# Patient Record
Sex: FEMALE | Race: ASIAN | NOT HISPANIC OR LATINO
[De-identification: names, ages, dates, MRNs, and addresses within clinical notes are randomized per-mention and may not be internally consistent; named-entity substitution may affect disease eponyms.]

---

## 2019-08-24 ENCOUNTER — TRANSCRIPTION ENCOUNTER (OUTPATIENT)
Age: 29
End: 2019-08-24

## 2019-08-25 ENCOUNTER — INPATIENT (INPATIENT)
Facility: HOSPITAL | Age: 29
LOS: 0 days | Discharge: HOME | End: 2019-08-26
Attending: OBSTETRICS & GYNECOLOGY | Admitting: OBSTETRICS & GYNECOLOGY
Payer: MEDICAID

## 2019-08-25 ENCOUNTER — RESULT REVIEW (OUTPATIENT)
Age: 29
End: 2019-08-25

## 2019-08-25 VITALS
SYSTOLIC BLOOD PRESSURE: 101 MMHG | RESPIRATION RATE: 18 BRPM | TEMPERATURE: 99 F | HEART RATE: 87 BPM | DIASTOLIC BLOOD PRESSURE: 57 MMHG | OXYGEN SATURATION: 100 %

## 2019-08-25 DIAGNOSIS — O00.90 UNSPECIFIED ECTOPIC PREGNANCY WITHOUT INTRAUTERINE PREGNANCY: ICD-10-CM

## 2019-08-25 LAB
ALBUMIN SERPL ELPH-MCNC: 4.3 G/DL — SIGNIFICANT CHANGE UP (ref 3.3–5)
ALBUMIN SERPL ELPH-MCNC: 4.4 G/DL — SIGNIFICANT CHANGE UP (ref 3.3–5)
ALP SERPL-CCNC: 69 U/L — SIGNIFICANT CHANGE UP (ref 40–120)
ALP SERPL-CCNC: 71 U/L — SIGNIFICANT CHANGE UP (ref 40–120)
ALT FLD-CCNC: 16 U/L — SIGNIFICANT CHANGE UP (ref 4–33)
ALT FLD-CCNC: 16 U/L — SIGNIFICANT CHANGE UP (ref 4–33)
ANION GAP SERPL CALC-SCNC: 12 MMO/L — SIGNIFICANT CHANGE UP (ref 7–14)
APPEARANCE UR: CLEAR — SIGNIFICANT CHANGE UP
APTT BLD: 30 SEC — SIGNIFICANT CHANGE UP (ref 27.5–36.3)
APTT BLD: 30.2 SEC — SIGNIFICANT CHANGE UP (ref 27.5–36.3)
AST SERPL-CCNC: 17 U/L — SIGNIFICANT CHANGE UP (ref 4–32)
AST SERPL-CCNC: 18 U/L — SIGNIFICANT CHANGE UP (ref 4–32)
BACTERIA # UR AUTO: SIGNIFICANT CHANGE UP
BASOPHILS # BLD AUTO: 0.04 K/UL — SIGNIFICANT CHANGE UP (ref 0–0.2)
BASOPHILS NFR BLD AUTO: 0.5 % — SIGNIFICANT CHANGE UP (ref 0–2)
BILIRUB DIRECT SERPL-MCNC: < 0.2 MG/DL — SIGNIFICANT CHANGE UP (ref 0.1–0.2)
BILIRUB SERPL-MCNC: 0.3 MG/DL — SIGNIFICANT CHANGE UP (ref 0.2–1.2)
BILIRUB SERPL-MCNC: 0.3 MG/DL — SIGNIFICANT CHANGE UP (ref 0.2–1.2)
BILIRUB UR-MCNC: NEGATIVE — SIGNIFICANT CHANGE UP
BLD GP AB SCN SERPL QL: POSITIVE — SIGNIFICANT CHANGE UP
BLOOD UR QL VISUAL: HIGH
BUN SERPL-MCNC: 11 MG/DL — SIGNIFICANT CHANGE UP (ref 7–23)
CALCIUM SERPL-MCNC: 8.8 MG/DL — SIGNIFICANT CHANGE UP (ref 8.4–10.5)
CHLORIDE SERPL-SCNC: 103 MMOL/L — SIGNIFICANT CHANGE UP (ref 98–107)
CO2 SERPL-SCNC: 23 MMOL/L — SIGNIFICANT CHANGE UP (ref 22–31)
COLOR SPEC: SIGNIFICANT CHANGE UP
CREAT SERPL-MCNC: 0.58 MG/DL — SIGNIFICANT CHANGE UP (ref 0.5–1.3)
EOSINOPHIL # BLD AUTO: 0.08 K/UL — SIGNIFICANT CHANGE UP (ref 0–0.5)
EOSINOPHIL NFR BLD AUTO: 1 % — SIGNIFICANT CHANGE UP (ref 0–6)
FIBRINOGEN PPP-MCNC: 466.5 MG/DL — SIGNIFICANT CHANGE UP (ref 350–510)
GLUCOSE SERPL-MCNC: 90 MG/DL — SIGNIFICANT CHANGE UP (ref 70–99)
GLUCOSE UR-MCNC: NEGATIVE — SIGNIFICANT CHANGE UP
HCG SERPL-ACNC: 336.1 MIU/ML — SIGNIFICANT CHANGE UP
HCT VFR BLD CALC: 29.8 % — LOW (ref 34.5–45)
HCT VFR BLD CALC: 30.5 % — LOW (ref 34.5–45)
HCT VFR BLD CALC: 30.6 % — LOW (ref 34.5–45)
HGB BLD-MCNC: 9.5 G/DL — LOW (ref 11.5–15.5)
HGB BLD-MCNC: 9.8 G/DL — LOW (ref 11.5–15.5)
HGB BLD-MCNC: 9.8 G/DL — LOW (ref 11.5–15.5)
HYALINE CASTS # UR AUTO: NEGATIVE — SIGNIFICANT CHANGE UP
IMM GRANULOCYTES NFR BLD AUTO: 0.4 % — SIGNIFICANT CHANGE UP (ref 0–1.5)
INR BLD: 1.1 — SIGNIFICANT CHANGE UP (ref 0.88–1.17)
INR BLD: 1.12 — SIGNIFICANT CHANGE UP (ref 0.88–1.17)
KETONES UR-MCNC: NEGATIVE — SIGNIFICANT CHANGE UP
LEUKOCYTE ESTERASE UR-ACNC: NEGATIVE — SIGNIFICANT CHANGE UP
LYMPHOCYTES # BLD AUTO: 1.87 K/UL — SIGNIFICANT CHANGE UP (ref 1–3.3)
LYMPHOCYTES # BLD AUTO: 22.2 % — SIGNIFICANT CHANGE UP (ref 13–44)
MCHC RBC-ENTMCNC: 25.2 PG — LOW (ref 27–34)
MCHC RBC-ENTMCNC: 25.3 PG — LOW (ref 27–34)
MCHC RBC-ENTMCNC: 25.4 PG — LOW (ref 27–34)
MCHC RBC-ENTMCNC: 31.9 % — LOW (ref 32–36)
MCHC RBC-ENTMCNC: 32 % — SIGNIFICANT CHANGE UP (ref 32–36)
MCHC RBC-ENTMCNC: 32.1 % — SIGNIFICANT CHANGE UP (ref 32–36)
MCV RBC AUTO: 78.6 FL — LOW (ref 80–100)
MCV RBC AUTO: 79 FL — LOW (ref 80–100)
MCV RBC AUTO: 79.3 FL — LOW (ref 80–100)
MONOCYTES # BLD AUTO: 0.54 K/UL — SIGNIFICANT CHANGE UP (ref 0–0.9)
MONOCYTES NFR BLD AUTO: 6.4 % — SIGNIFICANT CHANGE UP (ref 2–14)
NEUTROPHILS # BLD AUTO: 5.85 K/UL — SIGNIFICANT CHANGE UP (ref 1.8–7.4)
NEUTROPHILS NFR BLD AUTO: 69.5 % — SIGNIFICANT CHANGE UP (ref 43–77)
NITRITE UR-MCNC: NEGATIVE — SIGNIFICANT CHANGE UP
NRBC # FLD: 0 K/UL — SIGNIFICANT CHANGE UP (ref 0–0)
PH UR: 6.5 — SIGNIFICANT CHANGE UP (ref 5–8)
PLATELET # BLD AUTO: 234 K/UL — SIGNIFICANT CHANGE UP (ref 150–400)
PLATELET # BLD AUTO: 238 K/UL — SIGNIFICANT CHANGE UP (ref 150–400)
PLATELET # BLD AUTO: 245 K/UL — SIGNIFICANT CHANGE UP (ref 150–400)
PMV BLD: 10 FL — SIGNIFICANT CHANGE UP (ref 7–13)
PMV BLD: 9.7 FL — SIGNIFICANT CHANGE UP (ref 7–13)
PMV BLD: 9.8 FL — SIGNIFICANT CHANGE UP (ref 7–13)
POTASSIUM SERPL-MCNC: 3.9 MMOL/L — SIGNIFICANT CHANGE UP (ref 3.5–5.3)
POTASSIUM SERPL-SCNC: 3.9 MMOL/L — SIGNIFICANT CHANGE UP (ref 3.5–5.3)
PROT SERPL-MCNC: 7.3 G/DL — SIGNIFICANT CHANGE UP (ref 6–8.3)
PROT SERPL-MCNC: 7.3 G/DL — SIGNIFICANT CHANGE UP (ref 6–8.3)
PROT UR-MCNC: NEGATIVE — SIGNIFICANT CHANGE UP
PROTHROM AB SERPL-ACNC: 12.2 SEC — SIGNIFICANT CHANGE UP (ref 9.8–13.1)
PROTHROM AB SERPL-ACNC: 12.5 SEC — SIGNIFICANT CHANGE UP (ref 9.8–13.1)
RBC # BLD: 3.77 M/UL — LOW (ref 3.8–5.2)
RBC # BLD: 3.86 M/UL — SIGNIFICANT CHANGE UP (ref 3.8–5.2)
RBC # BLD: 3.88 M/UL — SIGNIFICANT CHANGE UP (ref 3.8–5.2)
RBC # FLD: 13.5 % — SIGNIFICANT CHANGE UP (ref 10.3–14.5)
RBC # FLD: 13.5 % — SIGNIFICANT CHANGE UP (ref 10.3–14.5)
RBC # FLD: 13.7 % — SIGNIFICANT CHANGE UP (ref 10.3–14.5)
RBC CASTS # UR COMP ASSIST: SIGNIFICANT CHANGE UP (ref 0–?)
RH IG SCN BLD-IMP: POSITIVE — SIGNIFICANT CHANGE UP
SODIUM SERPL-SCNC: 138 MMOL/L — SIGNIFICANT CHANGE UP (ref 135–145)
SP GR SPEC: 1.02 — SIGNIFICANT CHANGE UP (ref 1–1.04)
SQUAMOUS # UR AUTO: SIGNIFICANT CHANGE UP
UROBILINOGEN FLD QL: NORMAL — SIGNIFICANT CHANGE UP
WBC # BLD: 7.32 K/UL — SIGNIFICANT CHANGE UP (ref 3.8–10.5)
WBC # BLD: 7.9 K/UL — SIGNIFICANT CHANGE UP (ref 3.8–10.5)
WBC # BLD: 8.41 K/UL — SIGNIFICANT CHANGE UP (ref 3.8–10.5)
WBC # FLD AUTO: 7.32 K/UL — SIGNIFICANT CHANGE UP (ref 3.8–10.5)
WBC # FLD AUTO: 7.9 K/UL — SIGNIFICANT CHANGE UP (ref 3.8–10.5)
WBC # FLD AUTO: 8.41 K/UL — SIGNIFICANT CHANGE UP (ref 3.8–10.5)
WBC UR QL: SIGNIFICANT CHANGE UP (ref 0–?)

## 2019-08-25 PROCEDURE — 86077 PHYS BLOOD BANK SERV XMATCH: CPT

## 2019-08-25 PROCEDURE — 76817 TRANSVAGINAL US OBSTETRIC: CPT | Mod: 26

## 2019-08-25 PROCEDURE — 88305 TISSUE EXAM BY PATHOLOGIST: CPT | Mod: 26

## 2019-08-25 PROCEDURE — 59151 TREAT ECTOPIC PREGNANCY: CPT

## 2019-08-25 RX ORDER — ACETAMINOPHEN 500 MG
650 TABLET ORAL ONCE
Refills: 0 | Status: COMPLETED | OUTPATIENT
Start: 2019-08-25 | End: 2019-08-25

## 2019-08-25 RX ORDER — SODIUM CHLORIDE 9 MG/ML
1000 INJECTION, SOLUTION INTRAVENOUS
Refills: 0 | Status: DISCONTINUED | OUTPATIENT
Start: 2019-08-25 | End: 2019-08-26

## 2019-08-25 RX ORDER — ONDANSETRON 8 MG/1
4 TABLET, FILM COATED ORAL ONCE
Refills: 0 | Status: DISCONTINUED | OUTPATIENT
Start: 2019-08-25 | End: 2019-08-26

## 2019-08-25 RX ORDER — SODIUM CHLORIDE 9 MG/ML
1000 INJECTION INTRAMUSCULAR; INTRAVENOUS; SUBCUTANEOUS ONCE
Refills: 0 | Status: COMPLETED | OUTPATIENT
Start: 2019-08-25 | End: 2019-08-25

## 2019-08-25 RX ORDER — HYDROMORPHONE HYDROCHLORIDE 2 MG/ML
0.5 INJECTION INTRAMUSCULAR; INTRAVENOUS; SUBCUTANEOUS
Refills: 0 | Status: DISCONTINUED | OUTPATIENT
Start: 2019-08-25 | End: 2019-08-26

## 2019-08-25 RX ADMIN — SODIUM CHLORIDE 1000 MILLILITER(S): 9 INJECTION INTRAMUSCULAR; INTRAVENOUS; SUBCUTANEOUS at 19:40

## 2019-08-25 RX ADMIN — SODIUM CHLORIDE 125 MILLILITER(S): 9 INJECTION, SOLUTION INTRAVENOUS at 18:21

## 2019-08-25 NOTE — ASU DISCHARGE PLAN (ADULT/PEDIATRIC) - CALL YOUR DOCTOR IF YOU HAVE ANY OF THE FOLLOWING:
Nausea and vomiting that does not stop/Fever greater than (need to indicate Fahrenheit or Celsius)/Inability to tolerate liquids or foods/Bleeding that does not stop/Pain not relieved by Medications

## 2019-08-25 NOTE — ASU DISCHARGE PLAN (ADULT/PEDIATRIC) - ACTIVITY LEVEL
No intercourse/No heavy lifting/Nothing per rectum/Nothing per vagina/No tub baths/No tampons/No douching

## 2019-08-25 NOTE — ED PROVIDER NOTE - ABDOMINAL EXAM
tenderness to deep palpation localized to the periumbilical region.  no guarding or rebound tenderness./no organomegaly/soft/no pulsating masses

## 2019-08-25 NOTE — ED ADULT NURSE REASSESSMENT NOTE - NS ED NURSE REASSESS COMMENT FT1
report received at shift change, pt remains AAOx3, VS as noted, pt in NAD, awaiting OR, will continue to monitor pt.

## 2019-08-25 NOTE — ED PROVIDER NOTE - PROGRESS NOTE DETAILS
PA Smartt: US revealed Small cystic structure in endometrium without associated yolk sac or   fetal pole. adnexal mass noted  OB Consult placed. patient will be dispo appropriately pending their recommendations PA Smartt: Patient evaluated by OB and deemed to have rupture ectopic. patient admitted

## 2019-08-25 NOTE — ED PROVIDER NOTE - OBJECTIVE STATEMENT
Patient is a 28 y/o F, , currently pregnant, LMP  presents with c/o lower abdominal that is cramping in nature, nonradiating, accompanied by pinkish/blood tinge urine since . She also noted passage of loose watery stools which has currently resolved. Patient was evaluated in [facility] 2 days ago for same complaint. Work up was positive for Bhcg of 356.4, UA positive for moderate blood. transvaginal ultrasound was inconclusive for IUP or ectopic pregnancy. Patient was instructed to f/u in 2 days for repeat bchg, but she was not satisfied with the quality of care and decided to present to [facility] ER. She denies vaginal bleeding, passage of large clots, dysuria, nausea, vomiting constipation, fever, chills, back pain, HA, dizziness, lightheadness.

## 2019-08-25 NOTE — ED ADULT NURSE REASSESSMENT NOTE - NS ED NURSE REASSESS COMMENT FT1
pt to rm from results waiting area at 6;15/ pt speaks some english./ pt told to call  for clothing that pt still had on/ additional lab done as requested by attending  gyn/ pt vitaled  at this time/ mady anderson to    as per attending consent was given via  / pt  admits to eating small piecs of sons muffin abot 1 hr ago/   pt npo

## 2019-08-25 NOTE — H&P ADULT - ATTENDING COMMENTS
Attending Note     PT arrived to the ER at 11:43 AM     Had ultrasound that resulted at 4:25 pm     Consult called around 5:48     I came down to the see patient immediately    28 yo  LMP  here for abdominal pain  Pt was seen at [facility] at  with hcg 356.4. She presents today with worsening abdominal pain. Ultrasound shows moderate amount of complex fluid   Via Persian interpretator, reviewed that patient most likely has ruptured ectopic pregnancy.  Pt ate 1 hour ago. Reviewed need for emergent surgery given concern for ongoing bleeding and rupture ectopic.   Reviewed risks of the procedure including but not limited to bleeding, infection, damage to surrounding organs, and blood transfusion.   Consent signed   OR notified and setting up room   Type and cross 3 U PRBCS.     Sign out given to Dr. Malick Appiah MD MSc Attending Note     PT arrived to the ER at 11:43 AM     Had ultrasound that resulted at 4:25 pm     Consult called around 5:48     I came down to the see patient immediately    30 yo  LMP  here for abdominal pain  Pt was seen at [facility] at  with hcg 356.4. She presents today with worsening abdominal pain. Ultrasound shows moderate amount of complex fluid   Via Romansh interpretator, reviewed that patient most likely has ruptured ectopic pregnancy.  Pt ate 1 hour ago. Reviewed need for emergent surgery given concern for ongoing bleeding and rupture ectopic.   Reviewed risks of the procedure including but not limited to bleeding, infection, damage to surrounding organs, and blood transfusion.   Consent signed   OR notified and setting up room   Type and cross 3 U PRBCS.     Sign out given to Dr. Malick Appiah MD MSc

## 2019-08-25 NOTE — ED PROVIDER NOTE - CLINICAL SUMMARY MEDICAL DECISION MAKING FREE TEXT BOX
31 y/o F,  currently pregnant, presents with lower abdominal pain and blood tinge urine since . Patient evaluated at [facility] for same complaints 2 days ago, Bhc, UA + for blood, trans vaginal did not confirm IUP or ectopic pregnancy. patient states symptoms continue to persist. Plan is to trend Bhcg and repeat TVUS to r/o ectopic pregnancy. Patient will be consulted by GYN 31 y/o F,  currently pregnant, presents with lower abdominal pain and blood tinge urine since . Patient evaluated at [facility] for same complaints 2 days ago, Bhc.4, UA + for blood, trans vaginal did not confirm IUP or ectopic pregnancy. patient states symptoms continue to persist. Plan is to trend Bhcg and repeat TVUS to r/o ectopic pregnancy. Patient will be consulted by GYN

## 2019-08-25 NOTE — BRIEF OPERATIVE NOTE - OPERATION/FINDINGS
200 cc of hemoperitoneum evacuated. Ruptured ectopic pregnancy in R tube. Grossly normal survey of liver, bowel.

## 2019-08-25 NOTE — H&P ADULT - PROBLEM SELECTOR PLAN 1
- patient moved from results waiting to ED 16 for continuous monitoring  - fluid bolus stat  - stat CBC, coags, T&S  - admit to [facility], GYN  - 3 units pRBCs on hold, discussed with blood bank  - patient added on for OR  - consent in chart, signed  - patient counseled extensively    VICENTE Castellon PGY2  Patient seen and counseled with Dr. Appiah - patient moved from results waiting to ED 16 for continuous monitoring  - fluid bolus stat  - stat CBC, coags, T&S  - admit to [facility], GYN ( as Dr. Byers does not have laparoscopic privileges)  - 3 units pRBCs on hold, discussed with blood bank  - patient added on for OR  - consent in chart, signed  - patient counseled extensively    VICENTE Castellon PGY2  Patient seen and counseled with Dr. Appiah - patient moved from results waiting to ED 16 for continuous monitoring  - fluid bolus stat  - stat CBC, coags, T&S  - Rh positive, no Rhogam  - 3 units pRBCs on hold, pt antibody positive, discussed with blood bank  - admit to [facility], GYN ( as Dr. Byers does not have laparoscopic privileges)  - patient added on for OR  - consent in chart, signed  - patient counseled extensively    VICENTE Castellon PGY2  Patient seen and counseled with Dr. De Los Santos-Tej

## 2019-08-25 NOTE — CHART NOTE - NSCHARTNOTEFT_GEN_A_CORE
Backcharted due to clinic duties    Pt case had been booked in the OR, but was then bumped due to emergency case. Pt labs and vital signs stable at this time.     CINDA Arredondo PGY2  Dr. Teresa [place] [facility] [place] [place] attending and Dr. Zimmer  aware

## 2019-08-25 NOTE — ED ADULT TRIAGE NOTE - CHIEF COMPLAINT QUOTE
Pt c/o lower abdominal pain since 8/17 accompanied by intermittent diarrhea. Pt states last normal MP 7/13 and she is presently bleeding "a little bit"

## 2019-08-25 NOTE — H&P ADULT - HISTORY OF PRESENT ILLNESS
30 y/o , LMP 19, presenting with abdominal pain. Patient of Dr. Byers's. Has known pregnancy of unknown location, was seen at [facility] 19 at which time bHCG was 356.4 and TVUS was inconclusive. Patient instructed to return in 48 hours for close follow up. Presented to [facility] ED today instead. Patient reports abdominal pain since 19 that has been steadily worsening. Pain is 8/10 when she moves and 5/10 when she lays down. Reports vaginal spotting but denies heavy vaginal bleeding, abnormal vaginal discharge. Denies HA, dizziness, chest pain, shortness of breath, palpitations, nausea, vomiting, urinary symptoms, changes in bowel movements. This is a highly desired pregnancy. Patient last ate large meal at 11am, had 2 bites of food 2 hours ago.    OBHx: 2917 C/S for arrest, #6.1, eTOp D&C 2018  GYNHx: denies fibroids, cysts, abnormal paps, STDs, sexually active with   PMH: denies  PSH: C/S  All: NKDA  Meds: denies  Soc: no substance use, anxiety/depression    accepts blood 30 y/o , LMP 19, presenting with abdominal pain. Patient of Dr. Byers's. Has known pregnancy of unknown location, was seen at [facility] 19 at which time bHCG was 356.4 and TVUS was inconclusive. Patient instructed to return in 48 hours for close follow up. Presented to [facility] ED today instead. Patient reports abdominal pain since 19 that has been steadily worsening. Pain is 8/10 when she moves and 5/10 when she lays down. Reports vaginal spotting but denies heavy vaginal bleeding, abnormal vaginal discharge. Denies HA, dizziness, chest pain, shortness of breath, palpitations, nausea, vomiting, urinary symptoms, changes in bowel movements. This is a highly desired pregnancy. Patient last ate large meal at 11am, had 2 bites of food 2 hours ago.    OBHx: 2017 C/S for arrest, #6.1, eTOp D&C 2018  GYNHx: denies fibroids, cysts, abnormal paps, STDs, sexually active with   PMH: denies  PSH: C/S  All: NKDA  Meds: denies  Soc: no substance use, anxiety/depression    accepts blood

## 2019-08-25 NOTE — ED ADULT NURSE NOTE - OBJECTIVE STATEMENT
Pt presents to ED with mid lower abdominal pain and light vaginal bleeding since Aug 17. Pt AxOx3, ambulatory. pt states the pain is not bad when she is at rest and sometimes hurts when she moves. Pt denies other complaints at this time. 20g IVL placed in the R arm. Will continue to monitor.

## 2019-08-25 NOTE — ED PROVIDER NOTE - ATTENDING CONTRIBUTION TO CARE
28 yo F , LMP , presents with pelvic suprapubic cramping pain with vaginal bleeding since . NO abd pain at this time. usually mild cramping, suprapubic and R pelvic pain. no heavy bleeding. was seen at [facility] 2 days ago, hcg 356 ,US did not demonstrate IUP.   PE well appearing. lungs CTA. abd soft and NT.    hcg 336. RH +. labs nonactionable. UA no infection. US with no iup, large free fluid. obgyn consulted. suspect ruptured ectopic. pt was admitted to obgyn service in stable condition    I reviewed all lab and imaging results from this ED visit, and discussed ALL results with the patient and/or family, including all abnormal results and incidental findings. I addressed all of patient's/family's questions and concerns, and I recommended appropriate follow up for all incidental findings. Based on patient's HPI as well as the results of today's workup, I felt that patient warranted admission to the hospital for further workup/evaluation and continued management. The patient was agreeable with admission. Patient was signed out to admitting team. Admitting team accepted the patient for admission and subsequently took over the patient's care in its entirety.

## 2019-08-25 NOTE — ED ADULT NURSE REASSESSMENT NOTE - NS ED NURSE REASSESS COMMENT FT1
pt s vitals stable  pt offers no complaints a this time/ pt to go to OR as ON Call/  pt continues with some vag bleeding but no clots/  abd pain only on movement as per pt/  all clothing given to . pt told not to eat anything

## 2019-08-25 NOTE — H&P ADULT - ASSESSMENT
30 y/o , LMP 19, presenting with abdominal pain, plateauing bHCG, and TVUS consistent with ruptured ectopic pregnancy. Vitals stable at this time.

## 2019-08-25 NOTE — ASU DISCHARGE PLAN (ADULT/PEDIATRIC) - ASU DC SPECIAL INSTRUCTIONSFT
Expect abdominal cramping/pain and spotting for the next weeks. Take ibuprofen and Tylenol for cramping. Use a pad as needed. Call your physician or go to the emergency room if you experience any of the following: heavy vaginal bleeding (soaking more than 2 pads in 1 hour for 2 hours), fever, chills, nausea, vomiting, or pain that is not controlled by medication. Follow-up with [facility] in 2 weeks.

## 2019-08-26 VITALS — HEART RATE: 94 BPM | RESPIRATION RATE: 21 BRPM | OXYGEN SATURATION: 99 %

## 2019-08-27 ENCOUNTER — EMERGENCY (EMERGENCY)
Facility: HOSPITAL | Age: 29
LOS: 1 days | Discharge: HOME | End: 2019-08-27
Admitting: EMERGENCY MEDICINE
Payer: MEDICAID

## 2019-08-27 VITALS
OXYGEN SATURATION: 100 % | SYSTOLIC BLOOD PRESSURE: 100 MMHG | HEART RATE: 67 BPM | DIASTOLIC BLOOD PRESSURE: 66 MMHG | RESPIRATION RATE: 14 BRPM | TEMPERATURE: 99 F

## 2019-08-27 PROBLEM — Z00.00 ENCOUNTER FOR PREVENTIVE HEALTH EXAMINATION: Status: ACTIVE | Noted: 2019-08-27

## 2019-08-27 PROCEDURE — 99283 EMERGENCY DEPT VISIT LOW MDM: CPT

## 2019-08-27 NOTE — ED ADULT TRIAGE NOTE - CHIEF COMPLAINT QUOTE
c/o left lower quadrant pain. sent in from urgent care for abdominal tenderness and bruising to [facility]. pt had laparoscopic right salpingectomy 2 days ago.

## 2019-08-28 DIAGNOSIS — G89.18 OTHER ACUTE POSTPROCEDURAL PAIN: ICD-10-CM

## 2019-08-28 DIAGNOSIS — Z90.79 ACQUIRED ABSENCE OF OTHER GENITAL ORGAN(S): Chronic | ICD-10-CM

## 2019-08-28 NOTE — ED PROVIDER NOTE - CARDIAC, MLM
Clinic hours for Dr. Rosas:  MONDAY   WE ARE OUT OF THE OFFICE  TUESDAY  8:00 A.M. - 4:00 P.M.  WEDNESDAY  7:00 A.M. - 5:00 P.M.   THURSDAY  8:00 A.M. - 4:00 P.M.  FRIDAY    8:00 A.M. - 4:00 P.M.    [address] [place] [address]                      Phone: [phone]                  If you need a refill on your prescription please call your pharmacy and let them know. Please be proactive and call before your medication runs out. The pharmacy will then contact us for the refill. Please allow 24-48 hours for the refill to be processed.      If your physician has ordered additional laboratory or radiology testing as part of your ongoing plan of care, please allow 5-7 business days from the day of your lab draw or test for the results to be sent and reviewed by your provider. If your results are critical and require more immediate intervention, you will be contacted sooner. Your results will be conveyed to you via a phone call or letter.    If your appointment is for an annual physical please fast for 8-12 hours before your appointment.      If you are a [employer] user-Test results may be posted within a few hours or a few weeks, depending on the test. Once your test results are available for viewing, you will receive an e-mail stating that you have a test result, which is ready for review.  Not all tests result at the same time.  Your office may wait to reach out to you once ALL results are final. Please keep in mind, your doctor may not always have had the opportunity to review your results before they were released to your [employer] account.     You may receive a patient satisfaction survey in the mail. Please take the time to complete, as your feedback is very important to us. We strive to make your experience exceptional and your comments help us with that goal. We look forward to hearing from you.    [facility]     [address] [place] [address]  [place]  [address]               [phone]  Hours of Operation:   Monday - Friday 700 a.m. -1000 p.m.  Saturday and Sunday 800 a.m. - 400 p.m.  Holiday Hours Vary. Please call the clinic for Holiday hours.   Normal rate, regular rhythm.  Heart sounds S1, S2.  No murmurs, rubs or gallops.

## 2019-08-28 NOTE — ED PROVIDER NOTE - PROGRESS NOTE DETAILS
TOMY FROST: pt seen by GYN, pending recommendations. PA MARGOT: Patient reassessed, sitting comfortably in chair in NAD, denies any complaints. States feeling better, symptoms improved. Pt seen by GYN, states can f/u with GYN clinic as scheduled. The patient was given verbal and written discharge instructions. Specifically, instructions when to return to the ED and when to seek follow-up from their pcp was discussed. Any specialty follow-up was discussed, including how to make an appointment.  Instructions were discussed in simple, plain language and was understood by the patient. The patient understands that should their symptoms worsen or any new symptoms arise, they should return to the ED immediately for further evaluation. All pt's questions were answered. Patient verbalizes understanding.

## 2019-08-28 NOTE — ED PROVIDER NOTE - ABDOMINAL EXAM
soft/tender.../nondistended/no guarding, no rebound, bruising noted LLQ, appropriately tender to palpation, no drainage, no bleeding, no signs of infection

## 2019-08-28 NOTE — CONSULT NOTE ADULT - PROBLEM SELECTOR RECOMMENDATION 9
- Patient's pain is well controlled on Ibuprofen, bruising is normal. Nothing acute to do at this time.   - Patient instructed to follow up at postop appt with Dr. Byers office   CINDA Arredondo PGY2  to be d/w Dr. Byers

## 2019-08-28 NOTE — ED PROVIDER NOTE - NSFOLLOWUPINSTRUCTIONS_ED_ALL_ED_FT
Follow up with your PMD within 48-72 hrs. Follow up with your OBGYN as scheduled.  Rest, increase your fluids. No heavy lifting. Refrain from sexual intercourse.  Worsening pain, vaginal bleeding, vomiting, dizziness, weakness or ANY NEW CONCERNING SYMPTOMS return to the emergency department.

## 2019-08-28 NOTE — ED PROVIDER NOTE - OBJECTIVE STATEMENT
28 yo F, , with ruptured ectopic pregnancy s/p right salpingectomy 19, POD#3, sent to ER c/o LLQ bruising and tenderness today. Pt states having LLQ pain, 4/10, intermittent, worse with movements, non-radiating. Admits taking Ibuprofen with improvement. Reports noted some bruising in LLQ abdomen today when removing the dressing, got worried, and went to urgent care for further evaluation. As per , the patient has a lot tenderness after examined at urgent care, advised to go to the ER for further evaluation. Admits mild vaginal spotting. Denies any fever, chills, n/v/d/c, dizziness, chest pain, sob, back pain, dysuria, leg swelling or any other complaints.    Translation offered; preferred  to translate at bedside.

## 2019-08-28 NOTE — CONSULT NOTE ADULT - SUBJECTIVE AND OBJECTIVE BOX
CHAD DIAMOND  29y  Female [id number]    HPI:  30 y/o , LMP 19 POD 2 s/p R Salpingectomy for ruptured ectopic pregancy. Patient states she went to Urgent Care today to remove the dressing, and they sent her to the ED because of bruising in LLQ and pain when he pressed on her abdomen. Patient of Dr. Byers's. States abdominal pain is well controlled. She is ambulating, voiding, passing flatus, and tolerating PO. Denies Fevers, chills HA, dizziness, chest pain, shortness of breath, palpitations, nausea, vomiting, urinary symptoms, changes in bowel movements.     OBHx: 2017 C/S for arrest, #6.1, eTOp D&C 2018  GYNHx: denies fibroids, cysts, abnormal paps, STDs, sexually active with   PMH: denies  PSH: C/S  All: NKDA  Meds: denies  Soc: no substance use, anxiety/depression    accepts blood          Name of GYN Physician: Dr. Byers        PAST MEDICAL & SURGICAL HISTORY:  Ectopic pregnancy  H/O unilateral salpingectomy    Social History:  Denies smoking use, drug use, alcohol use.      Vital Signs Last 24 Hrs  T(C): 37.2 (27 Aug 2019 22:40), Max: 37.2 (27 Aug 2019 22:40)  T(F): 98.9 (27 Aug 2019 22:40), Max: 98.9 (27 Aug 2019 22:40)  HR: 67 (27 Aug 2019 22:40) (67 - 67)  BP: 100/66 (27 Aug 2019 22:40) (100/66 - 100/66)  BP(mean): --  RR: 14 (27 Aug 2019 22:40) (14 - 14)  SpO2: 100% (27 Aug 2019 22:40) (100% - 100%)    Physical Exam:   General: sitting comfortably in bed, NAD   Abd: Soft, non-tender, non-distended.  Port sites clean dry intact with steri strips in place. Bruising on left lower port site  :  No bleeding on pad.      Ext: non-tender b/l, no edema

## 2019-08-28 NOTE — ED PROVIDER NOTE - CLINICAL SUMMARY MEDICAL DECISION MAKING FREE TEXT BOX
30 yo F, , with ruptured ectopic pregnancy s/p right salpingectomy 19, POD#3, sent to ER c/o LLQ bruising and tenderness today. well appearing female p/w LLQ hematoma and pain post-op, pt is afebrile in ED, likely hematoma from surgery, no signs of infection, will consult GYN for eval, pain control and reassess. 28 yo F, , with ruptured ectopic pregnancy s/p right salpingectomy 19, POD#3, sent to ER c/o LLQ bruising and tenderness today. well appearing female p/w LLQ hematoma and pain post-op, pt is afebrile in ED, likely hematoma from surgery, no signs of infection, will consult GYN for eval, pain control and reassess. Pt just took Motrin, refused any pain med at this time.

## 2019-08-28 NOTE — ED PROVIDER NOTE - PATIENT PORTAL LINK FT
You can access the FollowMyHealth Patient Portal offered by [facility] by registering at the following website: [email or web address]. By joining [employer]’s FollowMyHealth portal, you will also be able to view your health information using other applications (apps) compatible with our system.

## 2019-09-17 ENCOUNTER — APPOINTMENT (OUTPATIENT)
Dept: OBGYN | Facility: HOSPITAL | Age: 29
End: 2019-09-17

## 2020-03-17 ENCOUNTER — APPOINTMENT (OUTPATIENT)
Dept: OBGYN | Facility: HOSPITAL | Age: 30
End: 2020-03-17

## 2020-06-04 ENCOUNTER — EMERGENCY (EMERGENCY)
Facility: HOSPITAL | Age: 30
LOS: 1 days | Discharge: HOME | End: 2020-06-04
Attending: EMERGENCY MEDICINE | Admitting: EMERGENCY MEDICINE
Payer: MEDICAID

## 2020-06-04 VITALS
TEMPERATURE: 98 F | HEART RATE: 84 BPM | OXYGEN SATURATION: 100 % | DIASTOLIC BLOOD PRESSURE: 57 MMHG | RESPIRATION RATE: 16 BRPM | SYSTOLIC BLOOD PRESSURE: 96 MMHG

## 2020-06-04 VITALS
RESPIRATION RATE: 16 BRPM | HEART RATE: 103 BPM | SYSTOLIC BLOOD PRESSURE: 118 MMHG | DIASTOLIC BLOOD PRESSURE: 94 MMHG | TEMPERATURE: 100 F | OXYGEN SATURATION: 100 %

## 2020-06-04 DIAGNOSIS — Z90.79 ACQUIRED ABSENCE OF OTHER GENITAL ORGAN(S): Chronic | ICD-10-CM

## 2020-06-04 PROCEDURE — 76817 TRANSVAGINAL US OBSTETRIC: CPT | Mod: 26

## 2020-06-04 PROCEDURE — 99284 EMERGENCY DEPT VISIT MOD MDM: CPT

## 2020-06-04 NOTE — ED PROVIDER NOTE - PHYSICAL EXAMINATION
Gen: AAOx3, non-toxic  Head: NCAT  HEENT: EOMI, oral mucosa moist, normal conjunctiva  Lung: CTAB, no respiratory distress, no wheezes/rhonchi/rales B/L, speaking in full sentences  CV: RRR, no murmurs, rubs or gallops  Abd: soft, NTND, no guarding, no CVA tenderness  MSK: no visible deformities  Neuro: No focal sensory or motor deficits  Skin: Warm, well perfused, no rash  Psych: normal affect.     ~Ze Esposito PGY2

## 2020-06-04 NOTE — ED PROVIDER NOTE - CLINICAL SUMMARY MEDICAL DECISION MAKING FREE TEXT BOX
Concern for 1st term miscarriage. Pt completely asymptomatic. No vaginal bleeding or pain. Well appearing, normal exam. Will assess for ectopic and miscarriage with TVUS then determine need for further evaluation.

## 2020-06-04 NOTE — ED PROVIDER NOTE - PROGRESS NOTE DETAILS
pt states her doctor told her that the baby had a heart beat two weeks ago, making her current TVUS findings likely reflective of a miscarriage. reached out to talk to her OBGYN to ensure close follow up (Jarod / Adilson [phone]), but have not received call back. pt requesting to leave now. will f/u with her OBGYN ASAP. strict return precautions given. lisa: pt states her doctor told her that the baby had a heart beat two weeks ago, making her current TVUS findings likely reflective of a miscarriage. reached out to talk to her OBGYN to ensure close follow up (Jarod / Adilson [phone]), but have not received call back. pt requesting to leave now. will f/u with her OBGYN ASAP. strict return precautions given.

## 2020-06-04 NOTE — ED PROVIDER NOTE - NSFOLLOWUPINSTRUCTIONS_ED_ALL_ED_FT
Follow up with your PCP and OBGYN in 24-48 hours.   May take Tylenol as directed on the bottle for pain control.   Return to the ER if you develop any new or worsening symptoms such as fever, lightheadedness, abdominal pain, chest pain, shortness of breath, numbness, weakness, nausea, vomiting, or visual changes.

## 2020-06-04 NOTE — ED PROVIDER NOTE - NS ED ROS FT

## 2020-06-04 NOTE — ED PROVIDER NOTE - OBJECTIVE STATEMENT
31 y/o  (1 living child, 1 , 1 ectopic s/p salpingectomy) female presents with concern for miscarriage. Was found to be in early pregnancy 6 weeks ago. Had an US 3 weeks ago that reportedly showed a healthy IUP. On Friday had a routine US at which time she was told she was having a miscarriage. Denies any symptoms including vaginal bleeding, cramping, pain, N/V/D, urinary symptoms, fever. Here for a second opinion.

## 2020-06-04 NOTE — ED ADULT TRIAGE NOTE - CHIEF COMPLAINT QUOTE
A&OX3 c/o second opinion to confirm fetal demise was told by her OB Marisa two days ago and had confirmed fetal demise, , reports third was ectopic pregnancy and has only one fallopian tube denies n/v/fever chills sob

## 2020-06-04 NOTE — ED PROVIDER NOTE - PATIENT PORTAL LINK FT
You can access the FollowMyHealth Patient Portal offered by [facility] by registering at the following website: [email or web address]. By joining [employer]’s FollowMyHealth portal, you will also be able to view your health information using other applications (apps) compatible with our system.

## 2020-06-08 ENCOUNTER — EMERGENCY (EMERGENCY)
Facility: HOSPITAL | Age: 30
LOS: 1 days | Discharge: HOME | End: 2020-06-08
Attending: EMERGENCY MEDICINE | Admitting: EMERGENCY MEDICINE
Payer: MEDICAID

## 2020-06-08 ENCOUNTER — RESULT REVIEW (OUTPATIENT)
Age: 30
End: 2020-06-08

## 2020-06-08 VITALS
TEMPERATURE: 99 F | DIASTOLIC BLOOD PRESSURE: 72 MMHG | SYSTOLIC BLOOD PRESSURE: 126 MMHG | HEART RATE: 78 BPM | RESPIRATION RATE: 18 BRPM | OXYGEN SATURATION: 100 %

## 2020-06-08 VITALS
OXYGEN SATURATION: 100 % | TEMPERATURE: 99 F | RESPIRATION RATE: 17 BRPM | HEART RATE: 98 BPM | DIASTOLIC BLOOD PRESSURE: 78 MMHG | SYSTOLIC BLOOD PRESSURE: 118 MMHG

## 2020-06-08 DIAGNOSIS — O03.9 COMPLETE OR UNSPECIFIED SPONTANEOUS ABORTION WITHOUT COMPLICATION: ICD-10-CM

## 2020-06-08 DIAGNOSIS — Z90.79 ACQUIRED ABSENCE OF OTHER GENITAL ORGAN(S): Chronic | ICD-10-CM

## 2020-06-08 LAB
ALBUMIN SERPL ELPH-MCNC: 4.7 G/DL — SIGNIFICANT CHANGE UP (ref 3.3–5)
ALP SERPL-CCNC: 68 U/L — SIGNIFICANT CHANGE UP (ref 40–120)
ALT FLD-CCNC: 15 U/L — SIGNIFICANT CHANGE UP (ref 4–33)
ANION GAP SERPL CALC-SCNC: 13 MMO/L — SIGNIFICANT CHANGE UP (ref 7–14)
ANTIBODY ID 1_1: SIGNIFICANT CHANGE UP
APPEARANCE UR: SIGNIFICANT CHANGE UP
AST SERPL-CCNC: 20 U/L — SIGNIFICANT CHANGE UP (ref 4–32)
BACTERIA # UR AUTO: NEGATIVE — SIGNIFICANT CHANGE UP
BASOPHILS # BLD AUTO: 0.03 K/UL — SIGNIFICANT CHANGE UP (ref 0–0.2)
BASOPHILS NFR BLD AUTO: 0.4 % — SIGNIFICANT CHANGE UP (ref 0–2)
BILIRUB SERPL-MCNC: 0.4 MG/DL — SIGNIFICANT CHANGE UP (ref 0.2–1.2)
BILIRUB UR-MCNC: NEGATIVE — SIGNIFICANT CHANGE UP
BLD GP AB SCN SERPL QL: POSITIVE — SIGNIFICANT CHANGE UP
BLOOD UR QL VISUAL: HIGH
BUN SERPL-MCNC: 8 MG/DL — SIGNIFICANT CHANGE UP (ref 7–23)
CALCIUM SERPL-MCNC: 9.5 MG/DL — SIGNIFICANT CHANGE UP (ref 8.4–10.5)
CHLORIDE SERPL-SCNC: 103 MMOL/L — SIGNIFICANT CHANGE UP (ref 98–107)
CO2 SERPL-SCNC: 22 MMOL/L — SIGNIFICANT CHANGE UP (ref 22–31)
COLOR SPEC: SIGNIFICANT CHANGE UP
CREAT SERPL-MCNC: 0.6 MG/DL — SIGNIFICANT CHANGE UP (ref 0.5–1.3)
DAT POLY-SP REAG RBC QL: NEGATIVE — SIGNIFICANT CHANGE UP
EOSINOPHIL # BLD AUTO: 0.2 K/UL — SIGNIFICANT CHANGE UP (ref 0–0.5)
EOSINOPHIL NFR BLD AUTO: 2.4 % — SIGNIFICANT CHANGE UP (ref 0–6)
GLUCOSE SERPL-MCNC: 110 MG/DL — HIGH (ref 70–99)
GLUCOSE UR-MCNC: NEGATIVE — SIGNIFICANT CHANGE UP
HCG SERPL-ACNC: 1849 MIU/ML — SIGNIFICANT CHANGE UP
HCT VFR BLD CALC: 39.6 % — SIGNIFICANT CHANGE UP (ref 34.5–45)
HGB BLD-MCNC: 13.2 G/DL — SIGNIFICANT CHANGE UP (ref 11.5–15.5)
HYALINE CASTS # UR AUTO: NEGATIVE — SIGNIFICANT CHANGE UP
IMM GRANULOCYTES NFR BLD AUTO: 0.4 % — SIGNIFICANT CHANGE UP (ref 0–1.5)
KETONES UR-MCNC: HIGH
LEUKOCYTE ESTERASE UR-ACNC: NEGATIVE — SIGNIFICANT CHANGE UP
LYMPHOCYTES # BLD AUTO: 2.15 K/UL — SIGNIFICANT CHANGE UP (ref 1–3.3)
LYMPHOCYTES # BLD AUTO: 25.4 % — SIGNIFICANT CHANGE UP (ref 13–44)
MCHC RBC-ENTMCNC: 26.5 PG — LOW (ref 27–34)
MCHC RBC-ENTMCNC: 33.3 % — SIGNIFICANT CHANGE UP (ref 32–36)
MCV RBC AUTO: 79.4 FL — LOW (ref 80–100)
MONOCYTES # BLD AUTO: 0.57 K/UL — SIGNIFICANT CHANGE UP (ref 0–0.9)
MONOCYTES NFR BLD AUTO: 6.7 % — SIGNIFICANT CHANGE UP (ref 2–14)
NEUTROPHILS # BLD AUTO: 5.49 K/UL — SIGNIFICANT CHANGE UP (ref 1.8–7.4)
NEUTROPHILS NFR BLD AUTO: 64.7 % — SIGNIFICANT CHANGE UP (ref 43–77)
NITRITE UR-MCNC: NEGATIVE — SIGNIFICANT CHANGE UP
NRBC # FLD: 0 K/UL — SIGNIFICANT CHANGE UP (ref 0–0)
PH UR: 6 — SIGNIFICANT CHANGE UP (ref 5–8)
PLATELET # BLD AUTO: 273 K/UL — SIGNIFICANT CHANGE UP (ref 150–400)
PMV BLD: 10.1 FL — SIGNIFICANT CHANGE UP (ref 7–13)
POTASSIUM SERPL-MCNC: 3.9 MMOL/L — SIGNIFICANT CHANGE UP (ref 3.5–5.3)
POTASSIUM SERPL-SCNC: 3.9 MMOL/L — SIGNIFICANT CHANGE UP (ref 3.5–5.3)
PROT SERPL-MCNC: 6.9 G/DL — SIGNIFICANT CHANGE UP (ref 6–8.3)
PROT UR-MCNC: 20 — SIGNIFICANT CHANGE UP
RBC # BLD: 4.99 M/UL — SIGNIFICANT CHANGE UP (ref 3.8–5.2)
RBC # FLD: 13.6 % — SIGNIFICANT CHANGE UP (ref 10.3–14.5)
RBC CASTS # UR COMP ASSIST: >50 — HIGH (ref 0–?)
RH IG SCN BLD-IMP: POSITIVE — SIGNIFICANT CHANGE UP
SODIUM SERPL-SCNC: 138 MMOL/L — SIGNIFICANT CHANGE UP (ref 135–145)
SP GR SPEC: 1.02 — SIGNIFICANT CHANGE UP (ref 1–1.04)
SQUAMOUS # UR AUTO: SIGNIFICANT CHANGE UP
UROBILINOGEN FLD QL: NORMAL — SIGNIFICANT CHANGE UP
WBC # BLD: 8.47 K/UL — SIGNIFICANT CHANGE UP (ref 3.8–10.5)
WBC # FLD AUTO: 8.47 K/UL — SIGNIFICANT CHANGE UP (ref 3.8–10.5)
WBC UR QL: SIGNIFICANT CHANGE UP (ref 0–?)

## 2020-06-08 PROCEDURE — 76830 TRANSVAGINAL US NON-OB: CPT | Mod: 26

## 2020-06-08 PROCEDURE — 88305 TISSUE EXAM BY PATHOLOGIST: CPT | Mod: 26

## 2020-06-08 PROCEDURE — 99285 EMERGENCY DEPT VISIT HI MDM: CPT

## 2020-06-08 PROCEDURE — 86077 PHYS BLOOD BANK SERV XMATCH: CPT

## 2020-06-08 NOTE — ED PROVIDER NOTE - ATTENDING CONTRIBUTION TO CARE
30 F , LMP 3/5/20, hx ectopic s/p R salpingectomy, presenting with vaginal bleeding x3 days with passing tissue. The pt was in the ED fou initially spoting but now heavier. does not have pain currently. no vomiting or fevers; denies sob, cp, lightheadedness. exam as above. US 4 days ago showed Fetal pole measuring 6 weeks with no fetal heart rate or yolk sac identified. Will obtain TVUS, labs, hcg, likely ob consult.I performed a history and physical exam of the patient and discussed their management with the resident. I reviewed the resident's note and agree with the documented findings and plan of care. My medical decision making and observations are found above. 30 F , LMP 3/5/20, hx ectopic s/p R salpingectomy, presenting with vaginal bleeding x3 days with passing tissue. The pt was in the ED with spotting but now heavier.  US 4 days ago showed Fetal pole measuring 6 weeks with no fetal heart rate or yolk sac identified. Will obtain TVUS, labs, hcg, likely ob consult.    TVUS showing: Embryonic demise with gestational sac in cervix noted on TVUS. Products of conception noted at cervical os on speculum exam, removed using forceps removed by OB. No active bleeding noted after removal of products.     Dx: Inevitable .     I performed a history and physical exam of the patient and discussed their management with the resident. I reviewed the resident's note and agree with the documented findings and plan of care. My medical decision making and observations are found above.

## 2020-06-08 NOTE — ED PROVIDER NOTE - PROGRESS NOTE DETAILS
Taurus Weldon MD. US noted. paged OB. awaiting call back Taurus Weldon MD. OB was at bedside. pt to f/u with her OB tomorrow (Dr. Lisa). Pending T&S. type and screen A+. no need for rhogam.

## 2020-06-08 NOTE — ED PROVIDER NOTE - NSFOLLOWUPINSTRUCTIONS_ED_ALL_ED_FT
Please follow up with your OB/GYN tomorrow. Please call the office to ensure that you have an appointment.  Please take Tylenol as needed for pain.  Please return to the emergency department if your symptoms worsen. Please follow up with your OB/GYN tomorrow. Please call the office to ensure that you have an appointment.  Please take Tylenol and Motrin as needed for pain.  Please return to the emergency department if your symptoms worsen.

## 2020-06-08 NOTE — ED PROVIDER NOTE - CLINICAL SUMMARY MEDICAL DECISION MAKING FREE TEXT BOX
Taurus Weldon MD. 30 F , LMP 3/5/20, hx ectopic s/p R salpingectomy, presnts for vaginal bleeding x3 days. initially spoting but now heavier. does not have pain currently. no vomiting or fevers; denies sob, cp, lightheadedness. exam as above. US 4 days ago showed Fetal pole measuring 6 weeks with no fetal heart rate or yolk sac identified. Will obtain TVUS, labs, hcg, likely ob consult.

## 2020-06-08 NOTE — ED PROCEDURE NOTE - GENERAL PROCEDURE NAME
POCUS: Emergency Department Focused Ultrasound to r/o torsion performed at patient's bedside.  The complete report can be found in the patient's chart.

## 2020-06-08 NOTE — ED ADULT NURSE NOTE - OBJECTIVE STATEMENT
Pt reports heavy vaginal bleeding starting this morning. Pt states she had a confirmed miscarriage yesterday here and was told to return for heavy bleeding. Pt states "the blood just wont stop falling out". IV established, labs drawn as ordered. Will continue to monitor.

## 2020-06-08 NOTE — ED PROVIDER NOTE - OBJECTIVE STATEMENT
30 F , hx ectopic s/p R salpingectomy and D&Cs, LMP 3/5/20 approx 3 month pregnant, presents to ED for vaginal bleeding x3 days. States initially was vaginal spotting and then became heavier today, to a point where she was soaking through 2-3 pads/30 minutes. Initially had pain 3 days ago but denies now. Denies vomiting, diarrhea, fever, chills, dysuria.   OB: Dr. Gema Byers 30 F , hx ectopic s/p R salpingectomy and D&Cs, LMP 3/5/20 approx 3 month pregnant, presents to ED for vaginal bleeding x3 days. States initially was vaginal spotting and then became heavier today, to a point where she was soaking through 2-3 pads/30 minutes. Initially had pain 3 days ago but denies now. Denies vomiting, diarrhea, fever, chills, dysuria.   Of note, pt had US on  with Fetal pole measuring 6 weeks with no fetal heart rate or yolk sac identified  OB: Dr. Gema Byers

## 2020-06-08 NOTE — ED PROVIDER NOTE - PATIENT PORTAL LINK FT
You can access the FollowMyHealth Patient Portal offered by [facility] by registering at the following website: [email or web address]. By joining [employer]’s FollowMyHealth portal, you will also be able to view your health information using other applications (apps) compatible with our system.

## 2020-06-08 NOTE — CONSULT NOTE ADULT - SUBJECTIVE AND OBJECTIVE BOX
31 yo  LMP 3/5 diagnosed with a MAB on  in the ED returns to ER with cramping and increased VB x2 days. After she was diagnosed with a MAB on , she followed up with her GYN outpatient the following day, and was offered Cytotec, but declined medical management. She was planned for a D&C tomorrow, but started bleeding heavily 2 days ago. She reports using 3 layers of pads at time today and passing large clots. She has not eaten all day today in anticipation for possible D&C today. She reports mild lightheadedness, but denies CP, SOB, n/v, fevers, chills, constipation, diarrhea, dysuria, frequency.     OBhx:  2017 C/S x1   2019 R ectopic pregnancy s/p lsc R salpingectomy   TOP x1     GYNhx - denies  PMHx - denies  PSHx - C/S x1 (2017), Lsc R salpingectomy (2019)   Meds - none  Allergies - NKDA  Social - denies tobacco, alcohol, recreational drugs    Vital Signs Last 24 Hrs  T(C): 37.1 (2020 12:50), Max: 37.2 (2020 09:53)  T(F): 98.7 (2020 12:50), Max: 98.9 (2020 09:53)  HR: 90 (2020 12:50) (90 - 98)  BP: 126/80 (2020 12:50) (118/78 - 126/80)  BP(mean): --  RR: 18 (2020 12:50) (17 - 18)  SpO2: 100% (2020 12:50) (100% - 100%)                          13.2   8.47  )-----------( 273      ( 2020 10:18 )             39.6       06-08    138  |  103  |  8   ----------------------------<  110<H>  3.9   |  22  |  0.60    Ca    9.5      2020 10:18    TPro  6.9  /  Alb  4.7  /  TBili  0.4  /  DBili  x   /  AST  20  /  ALT  15  /  AlkPhos  68  06-08    LIVER FUNCTIONS - ( 2020 10:18 )  Alb: 4.7 g/dL / Pro: 6.9 g/dL / ALK PHOS: 68 u/L / ALT: 15 u/L / AST: 20 u/L / GGT: x           Physical Exam:   General: sitting comfortably in bed, NAD, AOx3   Neck: supple, full ROM, no lymphadenopathy  CV: RRR   Lungs: CTA b/l, good air flow b/l   Abd: soft, NTND, no rebound or guarding   Pelvic: gestational sac noted to be protruding through the cervical os on speculum exam, removed using forceps. No active bleeding noted at the cervix. Cervix dilated 1-2cm on bimanual exam. No additional products palpated in cervix. Uterus firm and midline.   Ext: NT b/l, no edema      < from: US Transvaginal (20 @ 11:28) >  XAM:  US TRANSVAGINAL        PROCEDURE DATE:  2020         INTERPRETATION:  CLINICAL INFORMATION: Vaginal bleeding. History of missed  sonographically 4 days ago.    LMP: 3/5/2020    COMPARISON: 2020    TECHNIQUE:     Endovaginal pelvic sonogram only.    FINDINGS:  Uterus: Anteverted uterus measuring 9.2 x 4.3 x 3.9 cm. A gestational sac is identified within the cervix demonstrating the presence of an embryo with a crown-rump length of 5.2 mm, corresponding with a gestational age of 6 weeks 2 days. No embryonic heart motion is demonstrated.    Endometrium: Thickened and heterogeneous measuring 2 cm. Within normal limits.    Right ovary: 2.1 x 1.0 x 1.3 cm. Within normal limits.  Left ovary: 3.0 x 1.5 x 2.4 cm. Within normal limits. Left corpus luteum measures 1.7 cm.    Fluid: None.    IMPRESSION:  Embryonic demise with gestational sac in the cervix. Findings are consistent with the clinical history of missed .    < end of copied text >

## 2020-06-08 NOTE — CONSULT NOTE ADULT - ASSESSMENT
31 yo  LMP 3/5 diagnosed with MAB on  returns to ER with increased vaginal bleeding and cramping. Embryonic demise with gestational sac in cervix noted on TVUS. Products of conception noted at cervical os on speculum exam, removed using forceps. No active bleeding noted after removal of products. Uterus firm and midline. Patient hemodynamically stable with vitals wnl and H/H 13.2/39.6.

## 2020-06-08 NOTE — ED PROVIDER NOTE - NS ED ROS FT
Constitutional: no fevers or chills  HEENT: no visual changes, no sore throat, no rhinorrhea  CV: no cp or palpitations  Resp: no sob or cough  GI: no abd pain, no nausea, no vomiting, no diarrhea, no constipation  : no dysuria, no hematuria; VAGINAL BLEEDING  MSK: no myalgais or arthralgias  skin: no rashes  neuro: no HA, no numbness; no weakness, no tingling  ROS statement: all other ROS negative except as per HPI

## 2020-06-08 NOTE — CONSULT NOTE ADULT - PROBLEM SELECTOR RECOMMENDATION 9
- No further GYN interventions at this time  - F/u with Dr. Byers in the office tomorrow 6/9   - Tylenol, Motrin PRN for pain   - Blood type A pos, no need for Rhogam       Esra Demirel PGY2  d/w Dr. Byers

## 2020-06-08 NOTE — ED PROCEDURE NOTE - PROCEDURE ADDITIONAL DETAILS
[id number], ultrasound ovary, limited    Focused ED ultrasound to assess for ovarian torsion    Findings:   Ovary on affected side measures: *  No ovarian cysts or masses noted  normal ovarian echotexture  Normal doppler flow visualized on affected side  Venous and Arterial waveforms visualized on affected side    Impression: No signs of ovarian torsion    Procedure note and images placed in chart

## 2020-06-08 NOTE — ED PROVIDER NOTE - PHYSICAL EXAMINATION
PHYSICAL EXAM:  GENERAL: non-toxic appearing; in no respiratory distress  HEAD: Atraumatic, Normocephalic;  NECK: No JVD; FROM  EYES: PERRL, EOMs intact b/l w/out deficits  CHEST/LUNG: CTAB no wheezes/rhonchi/rales  HEART: RRR no murmur/gallops/rubs  ABDOMEN: +BS, soft, NT, ND  : Exam performed w/ chaperone Dr. Leal. Blood in vaginal vault; no tissue or clots seen. Os is fingertip opened;   EXTREMITIES: No LE edema, +2 radial pulses b/l  MUSCULOSKELETAL: FROM of all 4 extremities;   NERVOUS SYSTEM:  A&Ox3, No motor deficits or sensory deficits; CNII-XII intact; no focal neurologic deficits  SKIN:  No new rashes

## 2021-04-08 ENCOUNTER — APPOINTMENT (OUTPATIENT)
Dept: OBGYN | Facility: HOSPITAL | Age: 31
End: 2021-04-08
Payer: MEDICAID

## 2021-04-08 ENCOUNTER — RESULT REVIEW (OUTPATIENT)
Age: 31
End: 2021-04-08

## 2021-04-08 ENCOUNTER — OUTPATIENT (OUTPATIENT)
Dept: OUTPATIENT SERVICES | Facility: HOSPITAL | Age: 31
LOS: 1 days | End: 2021-04-08

## 2021-04-08 VITALS
SYSTOLIC BLOOD PRESSURE: 113 MMHG | WEIGHT: 142 LBS | TEMPERATURE: 98 F | BODY MASS INDEX: 26.13 KG/M2 | HEART RATE: 78 BPM | DIASTOLIC BLOOD PRESSURE: 67 MMHG | HEIGHT: 62 IN

## 2021-04-08 DIAGNOSIS — Z78.9 OTHER SPECIFIED HEALTH STATUS: ICD-10-CM

## 2021-04-08 DIAGNOSIS — Z90.79 ACQUIRED ABSENCE OF OTHER GENITAL ORGAN(S): Chronic | ICD-10-CM

## 2021-04-08 PROCEDURE — 99202 OFFICE O/P NEW SF 15 MIN: CPT | Mod: GC

## 2021-04-08 PROCEDURE — 99385 PREV VISIT NEW AGE 18-39: CPT | Mod: GC

## 2021-04-09 LAB
C TRACH RRNA SPEC QL NAA+PROBE: SIGNIFICANT CHANGE UP
C TRACH+GC RRNA SPEC QL PROBE: SIGNIFICANT CHANGE UP
HPV HIGH+LOW RISK DNA PNL CVX: SIGNIFICANT CHANGE UP
N GONORRHOEA RRNA SPEC QL NAA+PROBE: SIGNIFICANT CHANGE UP

## 2021-04-10 NOTE — HISTORY OF PRESENT ILLNESS
[FreeTextEntry1] : 31 y.o. Anibal speaking ID# [id number]  presenting for annual visit. Patient reports feeling well but is worried about future conception. She had a right ectopic pregnancy in 2019 and then had a missed  and passed products in the ED 2020. Patient states she is not currently trying to conceive but would like to and make sure her "body is okay." \par \par OBHx: pLTCS 2017, arrest of dilation. 2019 R ectopic pregnancy s/p lsc R salpingectomy \par Gyn: denies any fibroids, ovarian cysts, pap smears, STDs. \par Menstrual Hx: regular monthly cycles, periods last 2-3 days. \par PMHx: denies \par Meds: denies \par PSHx: c/s x 1 (), R salpingectomy () \par Allergies: shrimp -> anaphylaxis \par Social: denies any alcohol or illicit substance use \par Psych: denies anxiety or depression \par HCM: \par - not sure of last pap \par

## 2021-04-10 NOTE — REASON FOR VISIT
[Annual] : an annual visit. [[employer] ] : provided by [employer]   [FreeTextEntry1] : [id number]

## 2021-04-12 DIAGNOSIS — Z00.00 ENCOUNTER FOR GENERAL ADULT MEDICAL EXAMINATION WITHOUT ABNORMAL FINDINGS: ICD-10-CM

## 2021-04-12 DIAGNOSIS — Z78.9 OTHER SPECIFIED HEALTH STATUS: ICD-10-CM

## 2021-04-12 LAB — CYTOLOGY SPEC DOC CYTO: SIGNIFICANT CHANGE UP

## 2021-08-24 ENCOUNTER — EMERGENCY (EMERGENCY)
Facility: HOSPITAL | Age: 31
LOS: 1 days | Discharge: HOME | End: 2021-08-24
Attending: EMERGENCY MEDICINE | Admitting: EMERGENCY MEDICINE
Payer: MEDICAID

## 2021-08-24 VITALS
DIASTOLIC BLOOD PRESSURE: 69 MMHG | RESPIRATION RATE: 16 BRPM | HEIGHT: 62 IN | HEART RATE: 98 BPM | OXYGEN SATURATION: 100 % | SYSTOLIC BLOOD PRESSURE: 100 MMHG | TEMPERATURE: 99 F

## 2021-08-24 DIAGNOSIS — Z90.79 ACQUIRED ABSENCE OF OTHER GENITAL ORGAN(S): Chronic | ICD-10-CM

## 2021-08-24 DIAGNOSIS — O36.80X0 PREGNANCY WITH INCONCLUSIVE FETAL VIABILITY, NOT APPLICABLE OR UNSPECIFIED: ICD-10-CM

## 2021-08-24 LAB
ALBUMIN SERPL ELPH-MCNC: 4.5 G/DL — SIGNIFICANT CHANGE UP (ref 3.3–5)
ALP SERPL-CCNC: 89 U/L — SIGNIFICANT CHANGE UP (ref 40–120)
ALT FLD-CCNC: 31 U/L — SIGNIFICANT CHANGE UP (ref 4–33)
ANION GAP SERPL CALC-SCNC: 16 MMOL/L — HIGH (ref 7–14)
APPEARANCE UR: CLEAR — SIGNIFICANT CHANGE UP
AST SERPL-CCNC: 21 U/L — SIGNIFICANT CHANGE UP (ref 4–32)
BACTERIA # UR AUTO: NEGATIVE — SIGNIFICANT CHANGE UP
BASOPHILS # BLD AUTO: 0.05 K/UL — SIGNIFICANT CHANGE UP (ref 0–0.2)
BASOPHILS NFR BLD AUTO: 0.5 % — SIGNIFICANT CHANGE UP (ref 0–2)
BILIRUB SERPL-MCNC: 0.2 MG/DL — SIGNIFICANT CHANGE UP (ref 0.2–1.2)
BILIRUB UR-MCNC: NEGATIVE — SIGNIFICANT CHANGE UP
BLD GP AB SCN SERPL QL: NEGATIVE — SIGNIFICANT CHANGE UP
BUN SERPL-MCNC: 13 MG/DL — SIGNIFICANT CHANGE UP (ref 7–23)
CALCIUM SERPL-MCNC: 9.2 MG/DL — SIGNIFICANT CHANGE UP (ref 8.4–10.5)
CHLORIDE SERPL-SCNC: 103 MMOL/L — SIGNIFICANT CHANGE UP (ref 98–107)
CO2 SERPL-SCNC: 19 MMOL/L — LOW (ref 22–31)
COLOR SPEC: SIGNIFICANT CHANGE UP
CREAT SERPL-MCNC: 0.59 MG/DL — SIGNIFICANT CHANGE UP (ref 0.5–1.3)
DIFF PNL FLD: ABNORMAL
EOSINOPHIL # BLD AUTO: 0.22 K/UL — SIGNIFICANT CHANGE UP (ref 0–0.5)
EOSINOPHIL NFR BLD AUTO: 2.2 % — SIGNIFICANT CHANGE UP (ref 0–6)
EPI CELLS # UR: 0 /HPF — SIGNIFICANT CHANGE UP (ref 0–5)
GLUCOSE SERPL-MCNC: 82 MG/DL — SIGNIFICANT CHANGE UP (ref 70–99)
GLUCOSE UR QL: NEGATIVE — SIGNIFICANT CHANGE UP
HCG SERPL-ACNC: 48.5 MIU/ML — SIGNIFICANT CHANGE UP
HCT VFR BLD CALC: 40.8 % — SIGNIFICANT CHANGE UP (ref 34.5–45)
HGB BLD-MCNC: 13.3 G/DL — SIGNIFICANT CHANGE UP (ref 11.5–15.5)
IANC: 7.02 K/UL — SIGNIFICANT CHANGE UP (ref 1.5–8.5)
IMM GRANULOCYTES NFR BLD AUTO: 0.5 % — SIGNIFICANT CHANGE UP (ref 0–1.5)
KETONES UR-MCNC: NEGATIVE — SIGNIFICANT CHANGE UP
LEUKOCYTE ESTERASE UR-ACNC: NEGATIVE — SIGNIFICANT CHANGE UP
LYMPHOCYTES # BLD AUTO: 1.91 K/UL — SIGNIFICANT CHANGE UP (ref 1–3.3)
LYMPHOCYTES # BLD AUTO: 18.9 % — SIGNIFICANT CHANGE UP (ref 13–44)
MCHC RBC-ENTMCNC: 25.4 PG — LOW (ref 27–34)
MCHC RBC-ENTMCNC: 32.6 GM/DL — SIGNIFICANT CHANGE UP (ref 32–36)
MCV RBC AUTO: 78 FL — LOW (ref 80–100)
MONOCYTES # BLD AUTO: 0.88 K/UL — SIGNIFICANT CHANGE UP (ref 0–0.9)
MONOCYTES NFR BLD AUTO: 8.7 % — SIGNIFICANT CHANGE UP (ref 2–14)
NEUTROPHILS # BLD AUTO: 7.02 K/UL — SIGNIFICANT CHANGE UP (ref 1.8–7.4)
NEUTROPHILS NFR BLD AUTO: 69.2 % — SIGNIFICANT CHANGE UP (ref 43–77)
NITRITE UR-MCNC: NEGATIVE — SIGNIFICANT CHANGE UP
NRBC # BLD: 0 /100 WBCS — SIGNIFICANT CHANGE UP
NRBC # FLD: 0 K/UL — SIGNIFICANT CHANGE UP
PH UR: 6.5 — SIGNIFICANT CHANGE UP (ref 5–8)
PLATELET # BLD AUTO: 364 K/UL — SIGNIFICANT CHANGE UP (ref 150–400)
POTASSIUM SERPL-MCNC: 3.9 MMOL/L — SIGNIFICANT CHANGE UP (ref 3.5–5.3)
POTASSIUM SERPL-SCNC: 3.9 MMOL/L — SIGNIFICANT CHANGE UP (ref 3.5–5.3)
PROT SERPL-MCNC: 7.8 G/DL — SIGNIFICANT CHANGE UP (ref 6–8.3)
PROT UR-MCNC: ABNORMAL
RBC # BLD: 5.23 M/UL — HIGH (ref 3.8–5.2)
RBC # FLD: 13.4 % — SIGNIFICANT CHANGE UP (ref 10.3–14.5)
RBC CASTS # UR COMP ASSIST: 1 /HPF — SIGNIFICANT CHANGE UP (ref 0–4)
RH IG SCN BLD-IMP: POSITIVE — SIGNIFICANT CHANGE UP
SODIUM SERPL-SCNC: 138 MMOL/L — SIGNIFICANT CHANGE UP (ref 135–145)
SP GR SPEC: 1.02 — SIGNIFICANT CHANGE UP (ref 1.01–1.02)
UROBILINOGEN FLD QL: SIGNIFICANT CHANGE UP
WBC # BLD: 10.13 K/UL — SIGNIFICANT CHANGE UP (ref 3.8–10.5)
WBC # FLD AUTO: 10.13 K/UL — SIGNIFICANT CHANGE UP (ref 3.8–10.5)
WBC UR QL: 0 /HPF — SIGNIFICANT CHANGE UP (ref 0–5)

## 2021-08-24 PROCEDURE — 99285 EMERGENCY DEPT VISIT HI MDM: CPT

## 2021-08-24 PROCEDURE — 76830 TRANSVAGINAL US NON-OB: CPT | Mod: 26

## 2021-08-24 NOTE — ED PROVIDER NOTE - NSFOLLOWUPINSTRUCTIONS_ED_ALL_ED_FT
Please followup with Dr. Dwyer in 48 hours for hormone level, please take copy of blood work and ultrasound report with you to your appointment.  Return to ED if any worsening symptoms of pain, heavy bleeding.

## 2021-08-24 NOTE — ED ADULT NURSE NOTE - OBJECTIVE STATEMENT
Pt awake, alert and oriented x 4 c/o vaginal bleeding and 3 weeks pregnant as per home UCG.    Pt denies pelvic/abdominal pain or back pain.    Denies chest pain or SOB.    Denies pain/burning with urination.   IV placed, blood and urine sent.

## 2021-08-24 NOTE — ED PROVIDER NOTE - CLINICAL SUMMARY MEDICAL DECISION MAKING FREE TEXT BOX
appx 5.5 weeks pregnant with vaginal bleeding r/o ectopic vs threatened - labs US and pelvic exam pending

## 2021-08-24 NOTE — CONSULT NOTE ADULT - ASSESSMENT
32yo  LMP 7/10/21 presents for chief concern of vaginal spotting x2 days, bhcg 48.5, TVUS shows no evidence of pregnancy, c/w diagnosis of PUL. Patient hemodynamically stable, exam benign.

## 2021-08-24 NOTE — ED PROVIDER NOTE - PATIENT PORTAL LINK FT
You can access the FollowMyHealth Patient Portal offered by [facility] by registering at the following website: [email or web address]. By joining [employer]’s FollowMyHealth portal, you will also be able to view your health information using other applications (apps) compatible with our system.

## 2021-08-24 NOTE — ED PROVIDER NOTE - OBJECTIVE STATEMENT
31 yof with  hx of 1 termination, 1 ectopic requiring surgery, 1 spon ab now with LMP 7/10/21. Pt found to be incidentally pregnant while in ED last week for chest pain that has since resolved. Pt noted spotting yesterday and today noted some heavier bleeding, no pain.

## 2021-08-24 NOTE — ED ADULT TRIAGE NOTE - CHIEF COMPLAINT QUOTE
Pt c/o of vaginal bleeding started yesterday, approx 3 week gestation. Denies dizziness and lightheadedness, fever/chill, urinary issues.

## 2021-08-24 NOTE — CONSULT NOTE ADULT - PROBLEM SELECTOR RECOMMENDATION 9
Plan  - Repeat bhcg in 48hr in Dr. Byers's office   - Emergency return precautions discussed    d/w Dr. Zeeshan Beal PGY2

## 2021-08-24 NOTE — CONSULT NOTE ADULT - SUBJECTIVE AND OBJECTIVE BOX
CHAD DIAMOND  31y  Female [id number]    HPI:        Name of GYN Physician:     POB:      Pgyn: Denies fibroids, cysts, endometriosis, STI's, Abnormal pap smears     Home meds:     Hospital Meds:   MEDICATIONS  (STANDING):    MEDICATIONS  (PRN):      Allergies    No Known Allergies    Intolerances        PAST MEDICAL & SURGICAL HISTORY:  Ectopic pregnancy    H/O unilateral salpingectomy        FAMILY HISTORY:      Social History:  Denies smoking use, drug use, alcohol use.   +occasional social alcohol use    Vital Signs Last 24 Hrs  T(C): 37 (24 Aug 2021 12:06), Max: 37 (24 Aug 2021 12:06)  T(F): 98.6 (24 Aug 2021 12:06), Max: 98.6 (24 Aug 2021 12:06)  HR: 98 (24 Aug 2021 12:06) (98 - 98)  BP: 100/69 (24 Aug 2021 12:06) (100/69 - 100/69)  BP(mean): --  RR: 16 (24 Aug 2021 12:06) (16 - 16)  SpO2: 100% (24 Aug 2021 12:06) (100% - 100%)    Physical Exam:   General: sitting comfortably in bed, NAD   CV: RR S1S2 no m/r/g  Lungs: CTA b/l, good air flow b/l   Back: No CVA tenderness  Abd: Soft, non-tender, non-distended.  Bowel sounds present.    :  No bleeding on pad.    External labia wnl.  Bimanual exam with no cervical motion tenderness, uterus wnl, adnexa non palpable b/l.  Cervix closed vs. Cervix dilated    cm   Speculum Exam: No active bleeding from os.  Physiologic discharge.    Ext: non-tender b/l, no edema     LABS:                              13.3   10.13 )-----------( 364      ( 24 Aug 2021 13:40 )             40.8     08-24    138  |  103  |  13  ----------------------------<  82  3.9   |  19<L>  |  0.59    Ca    9.2      24 Aug 2021 13:40    TPro  7.8  /  Alb  4.5  /  TBili  0.2  /  DBili  x   /  AST  21  /  ALT  31  /  AlkPhos  89  08-24    I&O's Detail      Urinalysis Basic - ( 24 Aug 2021 13:37 )    Color: Light Yellow / Appearance: Clear / S.018 / pH: x  Gluc: x / Ketone: Negative  / Bili: Negative / Urobili: <2 mg/dL   Blood: x / Protein: Trace / Nitrite: Negative   Leuk Esterase: Negative / RBC: 1 /HPF / WBC 0 /HPF   Sq Epi: x / Non Sq Epi: 0 /HPF / Bacteria: Negative        RADIOLOGY & ADDITIONAL STUDIES: CHAD DIAMOND  31y  Female [id number]    HPI: 30yo  LMP 7/10/21 presents for chief concern of vaginal bleeding since yesterday. Bleeding has been intermittent and mostly consists of spotting, requiring only 1 pad overnight and today. She denies any fevers, chills, lightheadedness, chest pain, sob, abd pain, n/v/d, constipation or urinary complaints.     Name of GYN Physician: Dr. Gema Byers    POB:   2017- CS   s/p D&C  2019-R ectopic s/p RS  -    Pgyn: Denies fibroids, cysts, endometriosis, STI's, Abnormal pap smears     Home meds:   Denies    Allergies  No Known Allergies    PAST MEDICAL & SURGICAL HISTORY:  s/p RS as above    Social History:  Denies smoking use, drug use, alcohol use.      Vital Signs Last 24 Hrs  T(C): 37 (24 Aug 2021 12:06), Max: 37 (24 Aug 2021 12:06)  T(F): 98.6 (24 Aug 2021 12:06), Max: 98.6 (24 Aug 2021 12:06)  HR: 98 (24 Aug 2021 12:06) (98 - 98)  BP: 100/69 (24 Aug 2021 12:06) (100/69 - 100/69)  RR: 16 (24 Aug 2021 12:06) (16 - 16)  SpO2: 100% (24 Aug 2021 12:06) (100% - 100%)    Physical Exam:   General: sitting comfortably in bed, NAD   CV: RR S1S2 no m/r/g  Lungs: CTA b/l, good air flow b/l   Back: No CVA tenderness  Abd: Soft, non-tender, non-distended.  Bowel sounds present. No rebound or guarding.  : Scant blood streaking on pad. External labia wnl. Bimanual exam with no cervical motion tenderness, uterus wnl, adnexa non palpable b/l.  Cervix closed.  Speculum Exam: Scant blood staining in the vaginal vault. No active bleeding from os.  Physiologic discharge.    Ext: non-tender b/l, no edema     LABS:  bhc.5                        13.3   10.13 )-----------( 364      ( 24 Aug 2021 13:40 )             40.8     08-    138  |  103  |  13  ----------------------------<  82  3.9   |  19<L>  |  0.59    Ca    9.2      24 Aug 2021 13:40    TPro  7.8  /  Alb  4.5  /  TBili  0.2  /  DBili  x   /  AST  21  /  ALT  31  /  AlkPhos  89      I&O's Detail      Urinalysis Basic - ( 24 Aug 2021 13:37 )    Color: Light Yellow / Appearance: Clear / S.018 / pH: x  Gluc: x / Ketone: Negative  / Bili: Negative / Urobili: <2 mg/dL   Blood: x / Protein: Trace / Nitrite: Negative   Leuk Esterase: Negative / RBC: 1 /HPF / WBC 0 /HPF   Sq Epi: x / Non Sq Epi: 0 /HPF / Bacteria: Negative        RADIOLOGY & ADDITIONAL STUDIES:  TVUS FINDINGS:    Uterus: 6.5 x 3.1 x 4.5 cm.  Endometrium: 5 mm. No intrauterine gestation. Small fluid in the endocervical canal.    Right ovary: 2.9 cm x 1.2 cm x 1.5 cm. Within normal limits.  Left ovary: 2.7 cm x 1.1 cm x 2.1 cm. Within normal limits.    Fluid: None.    IMPRESSION:  Pregnancy of unknown location. Small fluid in the endocervical canal.    Recommend correlation with serial serum beta-hCG and follow-up pelvic ultrasoundas clinically indicated.

## 2021-08-30 ENCOUNTER — NON-APPOINTMENT (OUTPATIENT)
Age: 31
End: 2021-08-30

## 2021-08-31 ENCOUNTER — APPOINTMENT (OUTPATIENT)
Dept: OBGYN | Facility: HOSPITAL | Age: 31
End: 2021-08-31

## 2022-01-05 ENCOUNTER — EMERGENCY (EMERGENCY)
Facility: HOSPITAL | Age: 32
LOS: 1 days | Discharge: HOME | End: 2022-01-05
Attending: HOSPITALIST | Admitting: HOSPITALIST
Payer: MEDICAID

## 2022-01-05 VITALS
TEMPERATURE: 98 F | HEART RATE: 89 BPM | HEIGHT: 62 IN | SYSTOLIC BLOOD PRESSURE: 114 MMHG | OXYGEN SATURATION: 100 % | DIASTOLIC BLOOD PRESSURE: 87 MMHG | RESPIRATION RATE: 18 BRPM

## 2022-01-05 VITALS
RESPIRATION RATE: 16 BRPM | OXYGEN SATURATION: 100 % | HEART RATE: 76 BPM | SYSTOLIC BLOOD PRESSURE: 106 MMHG | DIASTOLIC BLOOD PRESSURE: 64 MMHG

## 2022-01-05 DIAGNOSIS — Z90.79 ACQUIRED ABSENCE OF OTHER GENITAL ORGAN(S): Chronic | ICD-10-CM

## 2022-01-05 PROCEDURE — 70450 CT HEAD/BRAIN W/O DYE: CPT | Mod: 26,MA

## 2022-01-05 PROCEDURE — 99284 EMERGENCY DEPT VISIT MOD MDM: CPT

## 2022-01-05 RX ORDER — ACETAMINOPHEN 500 MG
650 TABLET ORAL ONCE
Refills: 0 | Status: COMPLETED | OUTPATIENT
Start: 2022-01-05 | End: 2022-01-05

## 2022-01-05 RX ORDER — ONDANSETRON 8 MG/1
4 TABLET, FILM COATED ORAL ONCE
Refills: 0 | Status: COMPLETED | OUTPATIENT
Start: 2022-01-05 | End: 2022-01-05

## 2022-01-05 RX ADMIN — Medication 650 MILLIGRAM(S): at 18:14

## 2022-01-05 RX ADMIN — ONDANSETRON 4 MILLIGRAM(S): 8 TABLET, FILM COATED ORAL at 18:14

## 2022-01-05 NOTE — ED PROVIDER NOTE - PHYSICAL EXAMINATION
Vital signs reviewed.   CONSTITUTIONAL: Well-appearing; well-nourished; in no apparent distress. Non-toxic appearing.   HEAD: Normocephalic, atraumatic.  EYES: PERRL, EOM intact, conjunctiva and sclera WNL.  NECK/LYMPH: Supple; non-tender; no cervical lymphadenopathy.  CARD: Normal S1, S2; no murmurs, rubs, or gallops noted.  RESP: Normal chest excursion with respiration; breath sounds clear and equal bilaterally; no wheezes, rhonchi, or rales.  ABD/GI: soft, non-distended; non-tender; no palpable organomegaly, no pulsatile mass.  EXT/MS: moves all extremities; distal pulses are normal, no pedal edema.  SKIN: Normal for age and race; warm; dry; good turgor; no apparent lesions or exudate noted.  NEURO: Awake, alert, oriented x 3, no gross deficits, CN II-XII grossly intact, no motor or sensory deficit noted. ambulatory.   PSYCH: Normal mood; appropriate affect.

## 2022-01-05 NOTE — ED ADULT TRIAGE NOTE - CHIEF COMPLAINT QUOTE
Pt reporting to the ED for headache starting saturday after a cooking dish fell onto her head. Denies LOC, or anticoagulation use. Report nausea no vomiting, ambulatory with steady gait, hand grasp equal and strong.

## 2022-01-05 NOTE — ED PROVIDER NOTE - ATTENDING CONTRIBUTION TO CARE
31F c/o HA. patient states a heavy object hit the top of her head on Sunday when she was cooking. since she has been feeling nausea and has a HA. also reports some generalized weakness and chills. no neck pain.    ***GEN - NAD; well appearing; A+O x3 ***HEAD - NC/AT ***EYES/NOSE - PERRL, EOMI, mucous membranes moist, no discharge ***THROAT: Oral cavity and pharynx normal. No inflammation, swelling, exudate, or lesions.  ***NECK: Neck supple, non-tender without lymphadenopathy, no masses, no thyromegaly.   ***PULMONARY - CTA b/l, symmetric breath sounds. ***CARDIAC -s1s2, RRR, no M,G,R  ***ABDOMEN - +BS, ND, NT, soft, no guarding, no rebound, no masses   ***BACK - no CVA tenderness, Normal  spine ***EXTREMITIES - symmetric pulses, 2+ dp, capillary refill < 2 seconds, no clubbing, no cyanosis, no edema ***SKIN - no rash or bruising   ***NEUROLOGIC - alert, CN 2-12 intact    MDM: 31F with HA, nausea and weakness after accidentally being hit in the head sunday with a heavy cooking object at home.

## 2022-01-05 NOTE — ED PROVIDER NOTE - CLINICAL SUMMARY MEDICAL DECISION MAKING FREE TEXT BOX
32 yo F with no sig PMHX here with complaint of pain to top of head, intermittent headache, dizziness, nausea, and pressure to face since heavy object hit top of head on saturday. Patient also reports generalized weakness and chills.   ct head.   meds.   covid swab

## 2022-01-05 NOTE — ED PROVIDER NOTE - PATIENT PORTAL LINK FT
You can access the FollowMyHealth Patient Portal offered by [facility] by registering at the following website: [email or web address]. By joining [employer]’s FollowMyHealth portal, you will also be able to view your health information using other applications (apps) compatible with our system.

## 2022-01-05 NOTE — ED PROVIDER NOTE - OBJECTIVE STATEMENT
32 yo F with no sig PMHX here with complaint of pain to top of head, intermittent headache, dizziness, nausea, and pressure to face since heavy object hit top of head on saturday. Patient also reports generalized weakness and chills. Denies cp, sob, abdominal pain, focal weakness/ numbness tingling, neck pain, back pain, fevers, visual changes.

## 2022-01-06 LAB — SARS-COV-2 RNA SPEC QL NAA+PROBE: SIGNIFICANT CHANGE UP

## 2022-10-14 ENCOUNTER — EMERGENCY (EMERGENCY)
Facility: HOSPITAL | Age: 32
LOS: 1 days | Discharge: HOME | End: 2022-10-14
Attending: EMERGENCY MEDICINE | Admitting: EMERGENCY MEDICINE

## 2022-10-14 VITALS
HEIGHT: 62 IN | SYSTOLIC BLOOD PRESSURE: 118 MMHG | DIASTOLIC BLOOD PRESSURE: 96 MMHG | HEART RATE: 97 BPM | TEMPERATURE: 98 F | OXYGEN SATURATION: 100 % | RESPIRATION RATE: 17 BRPM

## 2022-10-14 VITALS
HEART RATE: 83 BPM | RESPIRATION RATE: 17 BRPM | TEMPERATURE: 98 F | SYSTOLIC BLOOD PRESSURE: 105 MMHG | DIASTOLIC BLOOD PRESSURE: 62 MMHG | OXYGEN SATURATION: 100 %

## 2022-10-14 DIAGNOSIS — Z90.79 ACQUIRED ABSENCE OF OTHER GENITAL ORGAN(S): Chronic | ICD-10-CM

## 2022-10-14 LAB
ALBUMIN SERPL ELPH-MCNC: 4.6 G/DL — SIGNIFICANT CHANGE UP (ref 3.3–5)
ALP SERPL-CCNC: 89 U/L — SIGNIFICANT CHANGE UP (ref 40–120)
ALT FLD-CCNC: 19 U/L — SIGNIFICANT CHANGE UP (ref 4–33)
ANION GAP SERPL CALC-SCNC: 12 MMOL/L — SIGNIFICANT CHANGE UP (ref 7–14)
APPEARANCE UR: CLEAR — SIGNIFICANT CHANGE UP
AST SERPL-CCNC: 21 U/L — SIGNIFICANT CHANGE UP (ref 4–32)
BASOPHILS # BLD AUTO: 0.04 K/UL — SIGNIFICANT CHANGE UP (ref 0–0.2)
BASOPHILS NFR BLD AUTO: 0.3 % — SIGNIFICANT CHANGE UP (ref 0–2)
BILIRUB SERPL-MCNC: <0.2 MG/DL — SIGNIFICANT CHANGE UP (ref 0.2–1.2)
BILIRUB UR-MCNC: NEGATIVE — SIGNIFICANT CHANGE UP
BUN SERPL-MCNC: 12 MG/DL — SIGNIFICANT CHANGE UP (ref 7–23)
CALCIUM SERPL-MCNC: 10.1 MG/DL — SIGNIFICANT CHANGE UP (ref 8.4–10.5)
CHLORIDE SERPL-SCNC: 104 MMOL/L — SIGNIFICANT CHANGE UP (ref 98–107)
CO2 SERPL-SCNC: 24 MMOL/L — SIGNIFICANT CHANGE UP (ref 22–31)
COLOR SPEC: SIGNIFICANT CHANGE UP
CREAT SERPL-MCNC: 0.67 MG/DL — SIGNIFICANT CHANGE UP (ref 0.5–1.3)
DIFF PNL FLD: NEGATIVE — SIGNIFICANT CHANGE UP
EGFR: 120 ML/MIN/1.73M2 — SIGNIFICANT CHANGE UP
EOSINOPHIL # BLD AUTO: 0.08 K/UL — SIGNIFICANT CHANGE UP (ref 0–0.5)
EOSINOPHIL NFR BLD AUTO: 0.7 % — SIGNIFICANT CHANGE UP (ref 0–6)
GLUCOSE SERPL-MCNC: 91 MG/DL — SIGNIFICANT CHANGE UP (ref 70–99)
GLUCOSE UR QL: NEGATIVE — SIGNIFICANT CHANGE UP
HCG SERPL-ACNC: <5 MIU/ML — SIGNIFICANT CHANGE UP
HCT VFR BLD CALC: 42.7 % — SIGNIFICANT CHANGE UP (ref 34.5–45)
HGB BLD-MCNC: 13.9 G/DL — SIGNIFICANT CHANGE UP (ref 11.5–15.5)
IANC: 9.27 K/UL — HIGH (ref 1.8–7.4)
IMM GRANULOCYTES NFR BLD AUTO: 0.3 % — SIGNIFICANT CHANGE UP (ref 0–0.9)
KETONES UR-MCNC: NEGATIVE — SIGNIFICANT CHANGE UP
LEUKOCYTE ESTERASE UR-ACNC: NEGATIVE — SIGNIFICANT CHANGE UP
LIDOCAIN IGE QN: 30 U/L — SIGNIFICANT CHANGE UP (ref 7–60)
LYMPHOCYTES # BLD AUTO: 1.34 K/UL — SIGNIFICANT CHANGE UP (ref 1–3.3)
LYMPHOCYTES # BLD AUTO: 11.7 % — LOW (ref 13–44)
MCHC RBC-ENTMCNC: 25.9 PG — LOW (ref 27–34)
MCHC RBC-ENTMCNC: 32.6 GM/DL — SIGNIFICANT CHANGE UP (ref 32–36)
MCV RBC AUTO: 79.7 FL — LOW (ref 80–100)
MONOCYTES # BLD AUTO: 0.68 K/UL — SIGNIFICANT CHANGE UP (ref 0–0.9)
MONOCYTES NFR BLD AUTO: 5.9 % — SIGNIFICANT CHANGE UP (ref 2–14)
NEUTROPHILS # BLD AUTO: 9.27 K/UL — HIGH (ref 1.8–7.4)
NEUTROPHILS NFR BLD AUTO: 81.1 % — HIGH (ref 43–77)
NITRITE UR-MCNC: NEGATIVE — SIGNIFICANT CHANGE UP
NRBC # BLD: 0 /100 WBCS — SIGNIFICANT CHANGE UP (ref 0–0)
NRBC # FLD: 0 K/UL — SIGNIFICANT CHANGE UP (ref 0–0)
PH UR: 6.5 — SIGNIFICANT CHANGE UP (ref 5–8)
PLATELET # BLD AUTO: 331 K/UL — SIGNIFICANT CHANGE UP (ref 150–400)
POTASSIUM SERPL-MCNC: 3.6 MMOL/L — SIGNIFICANT CHANGE UP (ref 3.5–5.3)
POTASSIUM SERPL-SCNC: 3.6 MMOL/L — SIGNIFICANT CHANGE UP (ref 3.5–5.3)
PROT SERPL-MCNC: 8 G/DL — SIGNIFICANT CHANGE UP (ref 6–8.3)
PROT UR-MCNC: NEGATIVE — SIGNIFICANT CHANGE UP
RBC # BLD: 5.36 M/UL — HIGH (ref 3.8–5.2)
RBC # FLD: 13.5 % — SIGNIFICANT CHANGE UP (ref 10.3–14.5)
SODIUM SERPL-SCNC: 140 MMOL/L — SIGNIFICANT CHANGE UP (ref 135–145)
SP GR SPEC: 1.02 — SIGNIFICANT CHANGE UP (ref 1.01–1.05)
TROPONIN T, HIGH SENSITIVITY RESULT: <6 NG/L — SIGNIFICANT CHANGE UP
TSH SERPL-MCNC: 1.72 UIU/ML — SIGNIFICANT CHANGE UP (ref 0.27–4.2)
UROBILINOGEN FLD QL: SIGNIFICANT CHANGE UP
WBC # BLD: 11.45 K/UL — HIGH (ref 3.8–10.5)
WBC # FLD AUTO: 11.45 K/UL — HIGH (ref 3.8–10.5)

## 2022-10-14 PROCEDURE — 71046 X-RAY EXAM CHEST 2 VIEWS: CPT | Mod: 26

## 2022-10-14 PROCEDURE — 99285 EMERGENCY DEPT VISIT HI MDM: CPT

## 2022-10-14 PROCEDURE — 93010 ELECTROCARDIOGRAM REPORT: CPT

## 2022-10-14 NOTE — ED ADULT NURSE REASSESSMENT NOTE - NS ED NURSE REASSESS COMMENT FT1
Patient at baseline mental status. Denies chest pain, headache and dizziness. breathing even unlabored. No pallor or diaphoresis noted at this time. Patient awaiting lab results at this time.

## 2022-10-14 NOTE — ED PROVIDER NOTE - CROS ED NEURO ALL NEG
Per patient, he does not need any refills at this time.    Nothing further needed at this time.     negative...

## 2022-10-14 NOTE — ED ADULT TRIAGE NOTE - CHIEF COMPLAINT QUOTE
pt ambulatory to triage, c/o waking up with hand and foot coldness with shaking, which has resolved. increased acid reflux, chest pain, with "whole body" weakness.

## 2022-10-14 NOTE — ED PROVIDER NOTE - PHYSICAL EXAMINATION
Attending/José: Well-appearing, NAD; PERRL/EOMI, non-icterus, supple, mildly enlarged thyroid, no SHERON, no JVD, RRR, CTAB; Abd-soft, NT/ND, no HSM; no LE edema, A&Ox3, nonfocal; Skin-warm/dry

## 2022-10-14 NOTE — ED PROVIDER NOTE - OBJECTIVE STATEMENT
32yF w/no stated pmhx presenting with chest pain. Pt states yesterday morning she woke with hand shakiness and feeling cold. This morning she had similar symptoms followed by chest pain, nausea, palpitations and lightheadedness. Pt reports feeling anxious about her symptoms, took an ant-acid which did help relieve the symptoms. Associated pt reports diffuse generalized weakness. Pt reports hx of prior similar episode 1 year ago. Pt denies shortness of breath 32yF w/no stated pmhx presenting with chest pain. Pt states yesterday morning she woke with hand shakiness and feeling cold. This morning she had similar symptoms followed by chest pain, nausea, palpitations and lightheadedness. Pt reports feeling anxious about her symptoms, took an ant-acid which did help relieve the symptoms. Associated pt reports diffuse generalized weakness. Pt reports hx of prior similar episode 1 year ago. Pt denies shortness of breath, syncope, headache, abd pain, vomiting, diarrhea, urinary complaints, urinary frequency, back pain, leg pain or swelling or any other concerns. 32yF w/no stated pmhx presenting with chest pain. Pt states yesterday morning she woke with hand shakiness and feeling cold. This morning she had similar symptoms followed by chest pain, nausea, palpitations and lightheadedness. Pt reports feeling anxious about her symptoms, took an ant-acid which did help relieve the symptoms. Associated pt reports diffuse generalized weakness. Pt reports hx of prior similar episode 1 year ago. Pt denies shortness of breath, syncope, headache, abd pain, vomiting, diarrhea, urinary complaints, urinary frequency, back pain, leg pain or swelling or any other concerns.    Attending/José: 33 yo F with h/o GERD, PSHx (, ectopic) p/w with several days of "shaking hands" resolved with PO intake, fatigue and today with palpitations. She also revealed fatigue. Denies weakness, chest pain, SOB, PENNY, n/v, change in vison, abd pain or change in urinary/bowel habits. Further denies weight loss. Pt reports no domestic home issues.  PMD: Dr. Gunjan Vázquez

## 2022-10-14 NOTE — ED PROVIDER NOTE - PATIENT PORTAL LINK FT
You can access the FollowMyHealth Patient Portal offered by [facility] by registering at the following website: [email or web address]. By joining [employer]’s FollowMyHealth portal, you will also be able to view your health information using other applications (apps) compatible with our system.

## 2022-10-14 NOTE — ED PROVIDER NOTE - NS ED ATTENDING STATEMENT MOD
This was a shared visit with the CANDE. I reviewed and verified the documentation and independently performed the documented:

## 2022-10-14 NOTE — ED PROVIDER NOTE - NSFOLLOWUPINSTRUCTIONS_ED_ALL_ED_FT
Please follow up with your primary care physician Dr. Vázquez next week    Copy of laboratory tests provided    If you developing worsening symptoms including difficulty breathing, weakness return back to the emergency department

## 2022-10-14 NOTE — ED PROVIDER NOTE - PROGRESS NOTE DETAILS
TOMY Ahuja: labs reviewed and within normal, trop<6, TSH normal, CXR clear lungs. UA pending, pt requesting discharge, will TOMY Ahuja: labs reviewed and within normal, trop<6, TSH normal, CXR clear lungs. UA normal. Will d/come with PMD follow up, pt will return with any worsening or concerning symptoms.

## 2022-10-16 LAB
CULTURE RESULTS: SIGNIFICANT CHANGE UP
SPECIMEN SOURCE: SIGNIFICANT CHANGE UP

## 2022-11-11 NOTE — H&P ADULT - NSHPLABSRESULTS_GEN_ALL_CORE
Health Maintenance Due   Topic Date Due    Hepatitis C Screening  Never done    Depression Screen  Never done    COVID-19 Vaccine (1) Never done    DTaP/Tdap/Td series (1 - Tdap) Never done    Cervical cancer screen  Never done    Flu Vaccine (1) Never done       Chief Complaint   Patient presents with    Establish Care    Physical       1. Have you been to the ER, urgent care clinic since your last visit? Hospitalized since your last visit? No    2. Have you seen or consulted any other health care providers outside of the [address] [place] [address] since your last visit? Include any pap smears or colon screening. No    3) Do you have an Advance Directive on file? no    4) Are you interested in receiving information on Advance Directives? NO      Patient is accompanied by self I have received verbal consent from Bailey Nagy to discuss any/all medical information while they are present in the room. 9.8    8.41  )-----------( 245      ( 25 Aug 2019 17:00 )             30.5     08-25    138  |  103  |  11  ----------------------------<  90  3.9   |  23  |  0.58    Ca    8.8      25 Aug 2019 14:11    TPro  7.3  /  Alb  4.3  /  TBili  0.3  /  DBili  < 0.2  /  AST  18  /  ALT  16  /  AlkPhos  69  08-25    < from: US Transvaginal, OB (08.25.19 @ 15:26) >      EXAM:  US OB TRANSVAGINAL        PROCEDURE DATE:  Aug 25 2019         INTERPRETATION:  CLINICAL INFORMATION: Positive urine hCG. Abdominal pain   and spotting. Beta-hCG is 336.      LMP: 7/3/2019    Estimated Gestational Age by LMP: 7 weeks 4 days    COMPARISON: None available.    Endovaginal pelvic sonogram only. Color and Spectral Doppler was   performed.    FINDINGS:    Uterus: 7.7 x 3.5 x 3.6 cm.    Endometrium: 1.0 cm. There is a small nonspecific 3.0 mm cystic focus in   the endometrium, which could represent an early gestational sac.    Right ovary: 1.6 x 2.5 x 1.9 cm. Vascular flow is demonstrated on color   Doppler and spectral imaging. No definite adnexal mass noted, although   there is a bulbous appearance to the right adnexa. This may be from an   adjacent bowel loop. An occult ectopic pregnancy cannot be excluded.     Left ovary: 2.3 x 2.0 x 1.2 cm. Within normal limits.  Vascular flow is   demonstrated on color Doppler and spectral imaging.    Fluid: Large volume of complex fluid extending into the right upper   quadrant.    IMPRESSION:    Moderate ascites/hemorrhagic fluid    Small cystic structure in endometrium without associated yolk sac or   fetal pole. This could be from early dates/ pregnancy. No definite   adnexal mass noted, although there is a bulbous appearance to the right   adnexa. This may be from an adjacent bowel loop. An occult ectopic   pregnancy cannot be excluded. Recommend close interval follow-up with   serial beta-hCG and ultrasound examinations.    < end of copied text >

## 2023-02-10 ENCOUNTER — OUTPATIENT (OUTPATIENT)
Dept: OUTPATIENT SERVICES | Facility: HOSPITAL | Age: 33
LOS: 1 days | End: 2023-02-10

## 2023-02-10 ENCOUNTER — RESULT CHARGE (OUTPATIENT)
Age: 33
End: 2023-02-10

## 2023-02-10 ENCOUNTER — APPOINTMENT (OUTPATIENT)
Dept: OBGYN | Facility: HOSPITAL | Age: 33
End: 2023-02-10

## 2023-02-10 DIAGNOSIS — Z90.79 ACQUIRED ABSENCE OF OTHER GENITAL ORGAN(S): Chronic | ICD-10-CM

## 2023-02-10 DIAGNOSIS — Z32.00 ENCOUNTER FOR PREGNANCY TEST, RESULT UNKNOWN: ICD-10-CM

## 2023-02-10 LAB
HCG UR QL: POSITIVE
QUALITY CONTROL: YES

## 2023-02-13 DIAGNOSIS — Z32.00 ENCOUNTER FOR PREGNANCY TEST, RESULT UNKNOWN: ICD-10-CM

## 2023-02-21 ENCOUNTER — EMERGENCY (EMERGENCY)
Facility: HOSPITAL | Age: 33
LOS: 1 days | Discharge: HOME | End: 2023-02-21
Attending: EMERGENCY MEDICINE | Admitting: STUDENT IN AN ORGANIZED HEALTH CARE EDUCATION/TRAINING PROGRAM
Payer: MEDICAID

## 2023-02-21 VITALS
RESPIRATION RATE: 17 BRPM | SYSTOLIC BLOOD PRESSURE: 106 MMHG | HEART RATE: 92 BPM | OXYGEN SATURATION: 100 % | DIASTOLIC BLOOD PRESSURE: 72 MMHG

## 2023-02-21 VITALS
OXYGEN SATURATION: 100 % | SYSTOLIC BLOOD PRESSURE: 112 MMHG | RESPIRATION RATE: 16 BRPM | HEART RATE: 71 BPM | TEMPERATURE: 99 F | DIASTOLIC BLOOD PRESSURE: 69 MMHG

## 2023-02-21 DIAGNOSIS — Z90.79 ACQUIRED ABSENCE OF OTHER GENITAL ORGAN(S): Chronic | ICD-10-CM

## 2023-02-21 LAB
ALBUMIN SERPL ELPH-MCNC: 4 G/DL — SIGNIFICANT CHANGE UP (ref 3.3–5)
ALP SERPL-CCNC: 70 U/L — SIGNIFICANT CHANGE UP (ref 40–120)
ALT FLD-CCNC: 22 U/L — SIGNIFICANT CHANGE UP (ref 4–33)
ANION GAP SERPL CALC-SCNC: 10 MMOL/L — SIGNIFICANT CHANGE UP (ref 7–14)
AST SERPL-CCNC: 17 U/L — SIGNIFICANT CHANGE UP (ref 4–32)
BASOPHILS # BLD AUTO: 0.03 K/UL — SIGNIFICANT CHANGE UP (ref 0–0.2)
BASOPHILS NFR BLD AUTO: 0.3 % — SIGNIFICANT CHANGE UP (ref 0–2)
BILIRUB SERPL-MCNC: 0.2 MG/DL — SIGNIFICANT CHANGE UP (ref 0.2–1.2)
BUN SERPL-MCNC: 7 MG/DL — SIGNIFICANT CHANGE UP (ref 7–23)
CALCIUM SERPL-MCNC: 9.2 MG/DL — SIGNIFICANT CHANGE UP (ref 8.4–10.5)
CHLORIDE SERPL-SCNC: 104 MMOL/L — SIGNIFICANT CHANGE UP (ref 98–107)
CO2 SERPL-SCNC: 22 MMOL/L — SIGNIFICANT CHANGE UP (ref 22–31)
CREAT SERPL-MCNC: 0.51 MG/DL — SIGNIFICANT CHANGE UP (ref 0.5–1.3)
EGFR: 127 ML/MIN/1.73M2 — SIGNIFICANT CHANGE UP
EOSINOPHIL # BLD AUTO: 0.05 K/UL — SIGNIFICANT CHANGE UP (ref 0–0.5)
EOSINOPHIL NFR BLD AUTO: 0.5 % — SIGNIFICANT CHANGE UP (ref 0–6)
GLUCOSE SERPL-MCNC: 82 MG/DL — SIGNIFICANT CHANGE UP (ref 70–99)
HCG SERPL-ACNC: SIGNIFICANT CHANGE UP MIU/ML
HCT VFR BLD CALC: 37.9 % — SIGNIFICANT CHANGE UP (ref 34.5–45)
HGB BLD-MCNC: 12.6 G/DL — SIGNIFICANT CHANGE UP (ref 11.5–15.5)
IANC: 7.09 K/UL — SIGNIFICANT CHANGE UP (ref 1.8–7.4)
IMM GRANULOCYTES NFR BLD AUTO: 0.4 % — SIGNIFICANT CHANGE UP (ref 0–0.9)
LYMPHOCYTES # BLD AUTO: 1.56 K/UL — SIGNIFICANT CHANGE UP (ref 1–3.3)
LYMPHOCYTES # BLD AUTO: 16.7 % — SIGNIFICANT CHANGE UP (ref 13–44)
MCHC RBC-ENTMCNC: 25.5 PG — LOW (ref 27–34)
MCHC RBC-ENTMCNC: 33.2 GM/DL — SIGNIFICANT CHANGE UP (ref 32–36)
MCV RBC AUTO: 76.6 FL — LOW (ref 80–100)
MONOCYTES # BLD AUTO: 0.56 K/UL — SIGNIFICANT CHANGE UP (ref 0–0.9)
MONOCYTES NFR BLD AUTO: 6 % — SIGNIFICANT CHANGE UP (ref 2–14)
NEUTROPHILS # BLD AUTO: 7.09 K/UL — SIGNIFICANT CHANGE UP (ref 1.8–7.4)
NEUTROPHILS NFR BLD AUTO: 76.1 % — SIGNIFICANT CHANGE UP (ref 43–77)
NRBC # BLD: 0 /100 WBCS — SIGNIFICANT CHANGE UP (ref 0–0)
NRBC # FLD: 0 K/UL — SIGNIFICANT CHANGE UP (ref 0–0)
PLATELET # BLD AUTO: 310 K/UL — SIGNIFICANT CHANGE UP (ref 150–400)
POTASSIUM SERPL-MCNC: 3.5 MMOL/L — SIGNIFICANT CHANGE UP (ref 3.5–5.3)
POTASSIUM SERPL-SCNC: 3.5 MMOL/L — SIGNIFICANT CHANGE UP (ref 3.5–5.3)
PROT SERPL-MCNC: 7.2 G/DL — SIGNIFICANT CHANGE UP (ref 6–8.3)
RBC # BLD: 4.95 M/UL — SIGNIFICANT CHANGE UP (ref 3.8–5.2)
RBC # FLD: 13 % — SIGNIFICANT CHANGE UP (ref 10.3–14.5)
SODIUM SERPL-SCNC: 136 MMOL/L — SIGNIFICANT CHANGE UP (ref 135–145)
WBC # BLD: 9.33 K/UL — SIGNIFICANT CHANGE UP (ref 3.8–10.5)
WBC # FLD AUTO: 9.33 K/UL — SIGNIFICANT CHANGE UP (ref 3.8–10.5)

## 2023-02-21 PROCEDURE — 99284 EMERGENCY DEPT VISIT MOD MDM: CPT

## 2023-02-21 RX ORDER — SODIUM CHLORIDE 9 MG/ML
1000 INJECTION, SOLUTION INTRAVENOUS
Refills: 0 | Status: DISCONTINUED | OUTPATIENT
Start: 2023-02-21 | End: 2023-02-25

## 2023-02-21 RX ORDER — ONDANSETRON 8 MG/1
8 TABLET, FILM COATED ORAL ONCE
Refills: 0 | Status: COMPLETED | OUTPATIENT
Start: 2023-02-21 | End: 2023-02-21

## 2023-02-21 RX ADMIN — SODIUM CHLORIDE 1000 MILLILITER(S): 9 INJECTION, SOLUTION INTRAVENOUS at 17:16

## 2023-02-21 RX ADMIN — ONDANSETRON 8 MILLIGRAM(S): 8 TABLET, FILM COATED ORAL at 17:16

## 2023-02-21 NOTE — ED ADULT NURSE REASSESSMENT NOTE - NS ED NURSE REASSESS COMMENT FT1
A&Ox4. ambulatory. NAD. pt denies SOB, chest pain, dizziness, weakness, urinary symptoms, HA, n/v/d, fevers, chills. respirations ar even and un labored. safety precaution maintained.

## 2023-02-21 NOTE — ED PROVIDER NOTE - PATIENT PORTAL LINK FT
You can access the FollowMyHealth Patient Portal offered by [facility] by registering at the following website: [email or web address]. By joining [employer]’s FollowMyHealth portal, you will also be able to view your health information using other applications (apps) compatible with our system.

## 2023-02-21 NOTE — ED PROVIDER NOTE - CLINICAL SUMMARY MEDICAL DECISION MAKING FREE TEXT BOX
33F  LMP  presents c/o fatigue, generalized weakness and N/V.  States she had similar symptoms with her previous pregnancy.  Has also h/o one miscarriage and one ectopic.  Denies fever/chills, abd pain, vaginal bleeding, urinary symptoms.      A/p  - likely N/V and fatigue from early pregnancy, r/o anemia, electrolyte abnl  - cbc, cmp, hcg  - meds, IVF, reassess

## 2023-02-21 NOTE — ED ADULT NURSE NOTE - CHIEF COMPLAINT QUOTE
Pt 7 weeks pregnant c/o generalized weakness, N/V. Unable to tolerate PO. Denies abdominal pain, vaginal bleeding.

## 2023-02-21 NOTE — ED ADULT NURSE NOTE - OBJECTIVE STATEMENT
Pt received in intake c/o N/V without abdominal pain.  Pt states she is 7 weeks pregnant and for the last 6 weeks has been having intermittent N/V with no appetite and intermittent dizziness.  Pt states she feels weak today as well.  Pt skin C/D/I, denies abdominal pain, no chest pain, palpitations.  IV placed in right AC#20 with IVF infusing.  Pt in NAD. Pt received in intake c/o N/V without abdominal pain.  Pt A+Ox3. Pt states she is 7 weeks pregnant and for the last 6 weeks has been having intermittent N/V with no appetite and intermittent dizziness.  Pt states she feels weak today as well.  No blood noted in vomitus.  As per pt no vaginal discharge or bleeding noted. Pt skin C/D/I, denies abdominal pain, no chest pain, palpitations.  IV placed in right AC#20 with IVF infusing.  Pt in NAD.

## 2023-02-22 LAB
CULTURE RESULTS: SIGNIFICANT CHANGE UP
SPECIMEN SOURCE: SIGNIFICANT CHANGE UP

## 2023-03-08 ENCOUNTER — APPOINTMENT (OUTPATIENT)
Dept: OBGYN | Facility: HOSPITAL | Age: 33
End: 2023-03-08

## 2023-03-10 ENCOUNTER — LABORATORY RESULT (OUTPATIENT)
Age: 33
End: 2023-03-10

## 2023-03-10 ENCOUNTER — APPOINTMENT (OUTPATIENT)
Dept: OBGYN | Facility: CLINIC | Age: 33
End: 2023-03-10
Payer: MEDICAID

## 2023-03-10 VITALS
DIASTOLIC BLOOD PRESSURE: 75 MMHG | HEART RATE: 81 BPM | HEIGHT: 62 IN | WEIGHT: 132 LBS | BODY MASS INDEX: 24.29 KG/M2 | SYSTOLIC BLOOD PRESSURE: 117 MMHG

## 2023-03-10 DIAGNOSIS — O21.9 VOMITING OF PREGNANCY, UNSPECIFIED: ICD-10-CM

## 2023-03-10 PROCEDURE — 99213 OFFICE O/P EST LOW 20 MIN: CPT | Mod: TH,25

## 2023-03-10 PROCEDURE — 99395 PREV VISIT EST AGE 18-39: CPT

## 2023-03-10 RX ORDER — ONDANSETRON 4 MG/1
4 TABLET, ORALLY DISINTEGRATING ORAL
Qty: 60 | Refills: 1 | Status: ACTIVE | COMMUNITY
Start: 2023-03-10 | End: 1900-01-01

## 2023-03-10 NOTE — HISTORY OF PRESENT ILLNESS
[FreeTextEntry1] : Patient is a 34 y/o . LMP 22. She had a positive home pregnancy test. She is endorsing occasional nausea and breast tenderness. \par \par OBHx:\par 3/3/17: primary C/S for arrest at 4cm, male, 6#10\par 2018: ectopic right salpingectomy\par 2020: SAB

## 2023-03-10 NOTE — DISCUSSION/SUMMARY
[FreeTextEntry1] : 34 y/o P1 at 10w2d by LMP presenting with amenorrhea\par -f/u pap, GCT/CT\par -f/u prenatal blood work drawn today\par -Rx sent for PNV and zofran\par -Routine PNC reviewed including nutrition, exercise and safe medications in pregnancy \par -High Risk Conditions: None\par -prior C/S desires repeat\par -f/u 2 weeks for NT/NIPT

## 2023-03-12 LAB
APPEARANCE: ABNORMAL
BACTERIA: NEGATIVE
BASOPHILS # BLD AUTO: 0.02 K/UL
BASOPHILS NFR BLD AUTO: 0.3 %
BILIRUBIN URINE: NEGATIVE
BLOOD URINE: NEGATIVE
C TRACH RRNA SPEC QL NAA+PROBE: NOT DETECTED
COLOR: YELLOW
EOSINOPHIL # BLD AUTO: 0.05 K/UL
EOSINOPHIL NFR BLD AUTO: 0.7 %
ESTIMATED AVERAGE GLUCOSE: 105 MG/DL
GLUCOSE QUALITATIVE U: NEGATIVE
GLUCOSE SERPL-MCNC: 104 MG/DL
HAV IGM SER QL: NONREACTIVE
HBA1C MFR BLD HPLC: 5.3 %
HBV CORE IGM SER QL: NONREACTIVE
HBV SURFACE AG SER QL: NONREACTIVE
HCT VFR BLD CALC: 39.2 %
HCV AB SER QL: NONREACTIVE
HCV S/CO RATIO: 0.13 S/CO
HGB BLD-MCNC: 12.1 G/DL
HIV1+2 AB SPEC QL IA.RAPID: NONREACTIVE
HYALINE CASTS: 1 /LPF
IMM GRANULOCYTES NFR BLD AUTO: 0.3 %
KETONES URINE: ABNORMAL
LEUKOCYTE ESTERASE URINE: NEGATIVE
LYMPHOCYTES # BLD AUTO: 1.28 K/UL
LYMPHOCYTES NFR BLD AUTO: 17 %
MAN DIFF?: NORMAL
MCHC RBC-ENTMCNC: 25.7 PG
MCHC RBC-ENTMCNC: 30.9 GM/DL
MCV RBC AUTO: 83.4 FL
MEV IGG FLD QL IA: 128 AU/ML
MEV IGG+IGM SER-IMP: POSITIVE
MICROSCOPIC-UA: NORMAL
MONOCYTES # BLD AUTO: 0.5 K/UL
MONOCYTES NFR BLD AUTO: 6.7 %
MUV AB SER-ACNC: POSITIVE
MUV IGG SER QL IA: 40.8 AU/ML
N GONORRHOEA RRNA SPEC QL NAA+PROBE: NOT DETECTED
NEUTROPHILS # BLD AUTO: 5.64 K/UL
NEUTROPHILS NFR BLD AUTO: 75 %
NITRITE URINE: NEGATIVE
PH URINE: 6.5
PLATELET # BLD AUTO: 294 K/UL
PROTEIN URINE: NORMAL
RBC # BLD: 4.7 M/UL
RBC # FLD: 14 %
RED BLOOD CELLS URINE: 8 /HPF
RUBV IGG FLD-ACNC: 0.3 INDEX
RUBV IGG SER-IMP: NEGATIVE
SOURCE AMPLIFICATION: NORMAL
SPECIFIC GRAVITY URINE: 1.02
SQUAMOUS EPITHELIAL CELLS: 3 /HPF
T PALLIDUM AB SER QL IA: NEGATIVE
UROBILINOGEN URINE: NORMAL
WBC # FLD AUTO: 7.51 K/UL
WHITE BLOOD CELLS URINE: 2 /HPF

## 2023-03-14 LAB
ABO + RH PNL BLD: NORMAL
BACTERIA UR CULT: NORMAL
LEAD BLD-MCNC: <1 UG/DL

## 2023-03-15 LAB
M TB IFN-G BLD-IMP: NEGATIVE
QUANTIFERON TB PLUS MITOGEN MINUS NIL: >10 IU/ML
QUANTIFERON TB PLUS NIL: 0.02 IU/ML
QUANTIFERON TB PLUS TB1 MINUS NIL: 0 IU/ML
QUANTIFERON TB PLUS TB2 MINUS NIL: 0 IU/ML

## 2023-03-16 LAB
AR GENE MUT ANL BLD/T: NORMAL
CFTR MUT TESTED BLD/T: NEGATIVE
FMR1 GENE MUT ANL BLD/T: NORMAL

## 2023-03-16 NOTE — ED ADULT TRIAGE NOTE - CCCP TRG CHIEF CMPLNT
Is This A New Presentation, Or A Follow-Up?: Skin Lesion Additional History: Mom would just like birth month check today. vaginal bleed

## 2023-03-18 LAB — CYTOLOGY CVX/VAG DOC THIN PREP: NORMAL

## 2023-03-24 ENCOUNTER — APPOINTMENT (OUTPATIENT)
Dept: ANTEPARTUM | Facility: CLINIC | Age: 33
End: 2023-03-24
Payer: MEDICAID

## 2023-03-24 ENCOUNTER — APPOINTMENT (OUTPATIENT)
Dept: OBGYN | Facility: CLINIC | Age: 33
End: 2023-03-24
Payer: MEDICAID

## 2023-03-24 VITALS
HEIGHT: 62 IN | SYSTOLIC BLOOD PRESSURE: 102 MMHG | DIASTOLIC BLOOD PRESSURE: 66 MMHG | WEIGHT: 131 LBS | BODY MASS INDEX: 24.11 KG/M2

## 2023-03-24 DIAGNOSIS — N92.6 IRREGULAR MENSTRUATION, UNSPECIFIED: ICD-10-CM

## 2023-03-24 PROCEDURE — 76813 OB US NUCHAL MEAS 1 GEST: CPT

## 2023-03-24 PROCEDURE — 76801 OB US < 14 WKS SINGLE FETUS: CPT | Mod: 59

## 2023-03-24 PROCEDURE — 99213 OFFICE O/P EST LOW 20 MIN: CPT | Mod: TH

## 2023-03-26 LAB
HEMOGLOBIN E: 26.4 %
HGB A MFR BLD: 70.1 %
HGB A2 MFR BLD: 3.5 %
HGB FRACT BLD-IMP: NORMAL
HGB S BLD QL SOLY: NEGATIVE
HPV HIGH+LOW RISK DNA PNL CVX: DETECTED

## 2023-04-04 LAB
CHROMOSOME13 INTERPRETATION: NORMAL
CHROMOSOME13 TEST RESULT: NORMAL
CHROMOSOME18 INTERPRETATION: NORMAL
CHROMOSOME18 TEST RESULT: NORMAL
CHROMOSOME21 INTERPRETATION: NORMAL
CHROMOSOME21 TEST RESULT: NORMAL
FETAL FRACTION: NORMAL
PERFORMANCE AND LIMITATIONS: NORMAL
SEX CHROMOSOME INTERPRETATION: NORMAL
SEX CHROMOSOME TEST RESULT: NORMAL
VERIFI PRENATAL TEST: NOT DETECTED

## 2023-04-12 ENCOUNTER — NON-APPOINTMENT (OUTPATIENT)
Age: 33
End: 2023-04-12

## 2023-04-21 ENCOUNTER — APPOINTMENT (OUTPATIENT)
Dept: OBGYN | Facility: CLINIC | Age: 33
End: 2023-04-21
Payer: MEDICAID

## 2023-04-21 VITALS
WEIGHT: 132 LBS | SYSTOLIC BLOOD PRESSURE: 103 MMHG | DIASTOLIC BLOOD PRESSURE: 69 MMHG | HEIGHT: 62 IN | BODY MASS INDEX: 24.29 KG/M2

## 2023-04-21 PROCEDURE — 99213 OFFICE O/P EST LOW 20 MIN: CPT | Mod: TH

## 2023-04-27 LAB
AFP MOM: 0.69
AFP VALUE: 27.2 NG/ML
ALPHA FETOPROTEIN SERUM COMMENT: NORMAL
ALPHA FETOPROTEIN SERUM INTERPRETATION: NORMAL
ALPHA FETOPROTEIN SERUM RESULTS: NORMAL
ALPHA FETOPROTEIN SERUM TEST RESULTS: NORMAL
GESTATIONAL AGE BASED ON: NORMAL
GESTATIONAL AGE ON COLLECTION DATE: 16.4 WEEKS
INSULIN DEP DIABETES: NO
MATERNAL AGE AT EDD AFP: 32.6 YR
MULTIPLE GESTATION: NO
OSBR RISK 1 IN: NORMAL
RACE: NORMAL
WEIGHT AFP: 132 LBS

## 2023-05-11 ENCOUNTER — APPOINTMENT (OUTPATIENT)
Dept: ANTEPARTUM | Facility: CLINIC | Age: 33
End: 2023-05-11
Payer: MEDICAID

## 2023-05-11 ENCOUNTER — APPOINTMENT (OUTPATIENT)
Dept: OBGYN | Facility: CLINIC | Age: 33
End: 2023-05-11
Payer: MEDICAID

## 2023-05-11 VITALS
HEIGHT: 62 IN | WEIGHT: 137 LBS | BODY MASS INDEX: 25.21 KG/M2 | SYSTOLIC BLOOD PRESSURE: 96 MMHG | DIASTOLIC BLOOD PRESSURE: 59 MMHG

## 2023-05-11 PROCEDURE — 76805 OB US >/= 14 WKS SNGL FETUS: CPT

## 2023-05-11 PROCEDURE — 99213 OFFICE O/P EST LOW 20 MIN: CPT | Mod: TH

## 2023-06-06 ENCOUNTER — NON-APPOINTMENT (OUTPATIENT)
Age: 33
End: 2023-06-06

## 2023-06-09 ENCOUNTER — APPOINTMENT (OUTPATIENT)
Dept: OBGYN | Facility: CLINIC | Age: 33
End: 2023-06-09
Payer: MEDICAID

## 2023-06-09 VITALS
BODY MASS INDEX: 25.95 KG/M2 | SYSTOLIC BLOOD PRESSURE: 101 MMHG | DIASTOLIC BLOOD PRESSURE: 67 MMHG | WEIGHT: 141 LBS | HEIGHT: 62 IN

## 2023-06-09 DIAGNOSIS — Z3A.23 23 WEEKS GESTATION OF PREGNANCY: ICD-10-CM

## 2023-06-09 PROCEDURE — 99213 OFFICE O/P EST LOW 20 MIN: CPT | Mod: TH

## 2023-06-11 RX ORDER — CHLORHEXIDINE GLUCONATE 4 %
325 (65 FE) LIQUID (ML) TOPICAL
Qty: 90 | Refills: 3 | Status: ACTIVE | COMMUNITY
Start: 2023-06-11 | End: 1900-01-01

## 2023-06-11 RX ORDER — DOCUSATE SODIUM 100 MG/1
100 CAPSULE, LIQUID FILLED ORAL
Qty: 90 | Refills: 3 | Status: ACTIVE | COMMUNITY
Start: 2023-06-11 | End: 1900-01-01

## 2023-06-12 ENCOUNTER — NON-APPOINTMENT (OUTPATIENT)
Age: 33
End: 2023-06-12

## 2023-06-13 LAB — GLUCOSE 1H P 50 G GLC PO SERPL-MCNC: 183 MG/DL

## 2023-06-14 DIAGNOSIS — O24.419 GESTATIONAL DIABETES MELLITUS IN PREGNANCY, UNSPECIFIED CONTROL: ICD-10-CM

## 2023-06-14 LAB
ESTIMATED AVERAGE GLUCOSE: 97 MG/DL
HBA1C MFR BLD HPLC: 5 %

## 2023-06-14 RX ORDER — BLOOD-GLUCOSE METER
W/DEVICE KIT MISCELLANEOUS
Qty: 1 | Refills: 0 | Status: ACTIVE | COMMUNITY
Start: 2023-06-14 | End: 1900-01-01

## 2023-06-14 RX ORDER — BLOOD SUGAR DIAGNOSTIC
STRIP MISCELLANEOUS
Qty: 2 | Refills: 3 | Status: ACTIVE | COMMUNITY
Start: 2023-06-14 | End: 1900-01-01

## 2023-06-14 RX ORDER — ISOPROPYL ALCOHOL 0.7 ML/ML
SWAB TOPICAL
Qty: 4 | Refills: 2 | Status: ACTIVE | COMMUNITY
Start: 2023-06-14 | End: 1900-01-01

## 2023-06-14 RX ORDER — LANCETS
EACH MISCELLANEOUS
Qty: 2 | Refills: 3 | Status: ACTIVE | COMMUNITY
Start: 2023-06-14 | End: 1900-01-01

## 2023-06-29 ENCOUNTER — APPOINTMENT (OUTPATIENT)
Dept: MATERNAL FETAL MEDICINE | Facility: CLINIC | Age: 33
End: 2023-06-29
Payer: MEDICAID

## 2023-06-29 ENCOUNTER — ASOB RESULT (OUTPATIENT)
Age: 33
End: 2023-06-29

## 2023-06-29 PROCEDURE — G0108 DIAB MANAGE TRN  PER INDIV: CPT | Mod: 95

## 2023-07-04 ENCOUNTER — NON-APPOINTMENT (OUTPATIENT)
Age: 33
End: 2023-07-04

## 2023-07-07 ENCOUNTER — APPOINTMENT (OUTPATIENT)
Dept: OBGYN | Facility: CLINIC | Age: 33
End: 2023-07-07
Payer: MEDICAID

## 2023-07-07 VITALS
WEIGHT: 146 LBS | DIASTOLIC BLOOD PRESSURE: 58 MMHG | HEIGHT: 62 IN | SYSTOLIC BLOOD PRESSURE: 93 MMHG | BODY MASS INDEX: 26.87 KG/M2

## 2023-07-07 PROCEDURE — 99213 OFFICE O/P EST LOW 20 MIN: CPT | Mod: TH

## 2023-07-11 NOTE — ED ADULT TRIAGE NOTE - TEMPERATURE IN CELSIUS (DEGREES C)
[Hyperlipidemia] : hyperlipidemia [Hypertension] : hypertension [Stable] : stable [Responding to Treatment] : responding to treatment [Exercise Regimen] : an exercise regimen [Weight Loss] : weight loss [___ Month(s)] : in [unfilled] month(s) [de-identified] : On Atorvastatin 80 mg PO daily and start Vascepa. Check labs in 3 months [de-identified] : On Carvedilol 12.5 mg PO BID, Amlodipine 10 mg PO daily. Losartan to 50 mg PO daily. Continue to keep BP log.  [de-identified] : bothered by Varicosities - will refer to Vascular [FreeTextEntry4] : check labs today                                                                                                                                                                                                                                                                                                                                                                                                                                                                                                                                                                                                                                                                                                                                                                                                                                                                                                                                                                                                                                                                                      v                                                                                                                                                                                               37.2

## 2023-07-17 NOTE — ED PROVIDER NOTE - PMH
Given workup no real psa concerns but will follow both data points and follow his luts as has large obstructing prostate, but doing well on meds. 
Ectopic pregnancy

## 2023-07-20 ENCOUNTER — APPOINTMENT (OUTPATIENT)
Dept: MATERNAL FETAL MEDICINE | Facility: CLINIC | Age: 33
End: 2023-07-20
Payer: MEDICAID

## 2023-07-20 ENCOUNTER — ASOB RESULT (OUTPATIENT)
Age: 33
End: 2023-07-20

## 2023-07-20 PROCEDURE — G0108 DIAB MANAGE TRN  PER INDIV: CPT | Mod: 95

## 2023-07-26 ENCOUNTER — APPOINTMENT (OUTPATIENT)
Dept: ANTEPARTUM | Facility: CLINIC | Age: 33
End: 2023-07-26

## 2023-07-26 ENCOUNTER — APPOINTMENT (OUTPATIENT)
Dept: OBGYN | Facility: CLINIC | Age: 33
End: 2023-07-26

## 2023-07-27 ENCOUNTER — APPOINTMENT (OUTPATIENT)
Dept: ANTEPARTUM | Facility: CLINIC | Age: 33
End: 2023-07-27
Payer: MEDICAID

## 2023-07-27 ENCOUNTER — APPOINTMENT (OUTPATIENT)
Dept: OBGYN | Facility: CLINIC | Age: 33
End: 2023-07-27
Payer: MEDICAID

## 2023-07-27 VITALS
SYSTOLIC BLOOD PRESSURE: 105 MMHG | HEIGHT: 62 IN | BODY MASS INDEX: 27.42 KG/M2 | DIASTOLIC BLOOD PRESSURE: 69 MMHG | WEIGHT: 149 LBS

## 2023-07-27 DIAGNOSIS — Z34.90 ENCOUNTER FOR SUPERVISION OF NORMAL PREGNANCY, UNSPECIFIED, UNSPECIFIED TRIMESTER: ICD-10-CM

## 2023-07-27 PROCEDURE — 99213 OFFICE O/P EST LOW 20 MIN: CPT | Mod: TH,25

## 2023-07-27 PROCEDURE — 90715 TDAP VACCINE 7 YRS/> IM: CPT

## 2023-07-27 PROCEDURE — 76816 OB US FOLLOW-UP PER FETUS: CPT

## 2023-07-27 PROCEDURE — 90471 IMMUNIZATION ADMIN: CPT

## 2023-07-27 PROCEDURE — 76819 FETAL BIOPHYS PROFIL W/O NST: CPT | Mod: 59

## 2023-08-10 ENCOUNTER — ASOB RESULT (OUTPATIENT)
Age: 33
End: 2023-08-10

## 2023-08-10 ENCOUNTER — APPOINTMENT (OUTPATIENT)
Dept: MATERNAL FETAL MEDICINE | Facility: CLINIC | Age: 33
End: 2023-08-10
Payer: MEDICAID

## 2023-08-10 ENCOUNTER — APPOINTMENT (OUTPATIENT)
Dept: OBGYN | Facility: CLINIC | Age: 33
End: 2023-08-10
Payer: MEDICAID

## 2023-08-10 VITALS
DIASTOLIC BLOOD PRESSURE: 54 MMHG | BODY MASS INDEX: 27.42 KG/M2 | SYSTOLIC BLOOD PRESSURE: 88 MMHG | HEIGHT: 62 IN | WEIGHT: 149 LBS

## 2023-08-10 PROCEDURE — G0108 DIAB MANAGE TRN  PER INDIV: CPT | Mod: 95

## 2023-08-10 PROCEDURE — 99213 OFFICE O/P EST LOW 20 MIN: CPT | Mod: TH,25

## 2023-08-22 ENCOUNTER — NON-APPOINTMENT (OUTPATIENT)
Age: 33
End: 2023-08-22

## 2023-08-24 ENCOUNTER — APPOINTMENT (OUTPATIENT)
Dept: OBGYN | Facility: CLINIC | Age: 33
End: 2023-08-24
Payer: MEDICAID

## 2023-08-24 ENCOUNTER — APPOINTMENT (OUTPATIENT)
Dept: ANTEPARTUM | Facility: CLINIC | Age: 33
End: 2023-08-24
Payer: MEDICAID

## 2023-08-24 VITALS — SYSTOLIC BLOOD PRESSURE: 106 MMHG | BODY MASS INDEX: 27.8 KG/M2 | WEIGHT: 152 LBS | DIASTOLIC BLOOD PRESSURE: 69 MMHG

## 2023-08-24 PROCEDURE — 76819 FETAL BIOPHYS PROFIL W/O NST: CPT

## 2023-08-24 PROCEDURE — 76816 OB US FOLLOW-UP PER FETUS: CPT | Mod: 59

## 2023-08-24 PROCEDURE — 99213 OFFICE O/P EST LOW 20 MIN: CPT | Mod: TH

## 2023-09-07 ENCOUNTER — APPOINTMENT (OUTPATIENT)
Dept: OBGYN | Facility: CLINIC | Age: 33
End: 2023-09-07
Payer: MEDICAID

## 2023-09-07 ENCOUNTER — APPOINTMENT (OUTPATIENT)
Dept: MATERNAL FETAL MEDICINE | Facility: CLINIC | Age: 33
End: 2023-09-07
Payer: MEDICAID

## 2023-09-07 ENCOUNTER — ASOB RESULT (OUTPATIENT)
Age: 33
End: 2023-09-07

## 2023-09-07 VITALS — DIASTOLIC BLOOD PRESSURE: 69 MMHG | BODY MASS INDEX: 28.53 KG/M2 | SYSTOLIC BLOOD PRESSURE: 110 MMHG | WEIGHT: 156 LBS

## 2023-09-07 DIAGNOSIS — Z3A.36 36 WEEKS GESTATION OF PREGNANCY: ICD-10-CM

## 2023-09-07 PROCEDURE — G0108 DIAB MANAGE TRN  PER INDIV: CPT | Mod: 95

## 2023-09-07 PROCEDURE — 99213 OFFICE O/P EST LOW 20 MIN: CPT | Mod: TH,25

## 2023-09-08 LAB
HCT VFR BLD CALC: 33.9 %
HGB BLD-MCNC: 10.9 G/DL
HIV1+2 AB SPEC QL IA.RAPID: NONREACTIVE
MCHC RBC-ENTMCNC: 27.7 PG
MCHC RBC-ENTMCNC: 32.2 GM/DL
MCV RBC AUTO: 86.3 FL
PLATELET # BLD AUTO: 213 K/UL
RBC # BLD: 3.93 M/UL
RBC # FLD: 15.2 %
WBC # FLD AUTO: 11.8 K/UL

## 2023-09-12 LAB
GP B STREP DNA SPEC QL NAA+PROBE: NOT DETECTED
SOURCE GBS: NORMAL

## 2023-09-14 ENCOUNTER — APPOINTMENT (OUTPATIENT)
Dept: OBGYN | Facility: CLINIC | Age: 33
End: 2023-09-14
Payer: MEDICAID

## 2023-09-14 ENCOUNTER — APPOINTMENT (OUTPATIENT)
Dept: ANTEPARTUM | Facility: CLINIC | Age: 33
End: 2023-09-14
Payer: MEDICAID

## 2023-09-14 ENCOUNTER — TRANSCRIPTION ENCOUNTER (OUTPATIENT)
Age: 33
End: 2023-09-14

## 2023-09-14 VITALS
BODY MASS INDEX: 28.16 KG/M2 | DIASTOLIC BLOOD PRESSURE: 64 MMHG | SYSTOLIC BLOOD PRESSURE: 102 MMHG | WEIGHT: 153 LBS | HEIGHT: 62 IN

## 2023-09-14 PROCEDURE — 76816 OB US FOLLOW-UP PER FETUS: CPT

## 2023-09-14 PROCEDURE — 76819 FETAL BIOPHYS PROFIL W/O NST: CPT | Mod: 59

## 2023-09-14 PROCEDURE — 99213 OFFICE O/P EST LOW 20 MIN: CPT | Mod: TH

## 2023-09-18 ENCOUNTER — OUTPATIENT (OUTPATIENT)
Dept: OUTPATIENT SERVICES | Facility: HOSPITAL | Age: 33
LOS: 1 days | End: 2023-09-18

## 2023-09-18 VITALS
DIASTOLIC BLOOD PRESSURE: 70 MMHG | WEIGHT: 153 LBS | SYSTOLIC BLOOD PRESSURE: 105 MMHG | HEIGHT: 61 IN | TEMPERATURE: 97 F | RESPIRATION RATE: 16 BRPM | OXYGEN SATURATION: 97 % | HEART RATE: 92 BPM

## 2023-09-18 DIAGNOSIS — Z98.891 HISTORY OF UTERINE SCAR FROM PREVIOUS SURGERY: Chronic | ICD-10-CM

## 2023-09-18 DIAGNOSIS — Z98.890 OTHER SPECIFIED POSTPROCEDURAL STATES: Chronic | ICD-10-CM

## 2023-09-18 DIAGNOSIS — O34.211 MATERNAL CARE FOR LOW TRANSVERSE SCAR FROM PREVIOUS CESAREAN DELIVERY: ICD-10-CM

## 2023-09-18 DIAGNOSIS — Z90.79 ACQUIRED ABSENCE OF OTHER GENITAL ORGAN(S): Chronic | ICD-10-CM

## 2023-09-18 DIAGNOSIS — Z87.59 PERSONAL HISTORY OF OTHER COMPLICATIONS OF PREGNANCY, CHILDBIRTH AND THE PUERPERIUM: Chronic | ICD-10-CM

## 2023-09-18 LAB
ANION GAP SERPL CALC-SCNC: 13 MMOL/L — SIGNIFICANT CHANGE UP (ref 7–14)
BLD GP AB SCN SERPL QL: NEGATIVE — SIGNIFICANT CHANGE UP
BUN SERPL-MCNC: 8 MG/DL — SIGNIFICANT CHANGE UP (ref 7–23)
CALCIUM SERPL-MCNC: 8.9 MG/DL — SIGNIFICANT CHANGE UP (ref 8.4–10.5)
CHLORIDE SERPL-SCNC: 104 MMOL/L — SIGNIFICANT CHANGE UP (ref 98–107)
CO2 SERPL-SCNC: 19 MMOL/L — LOW (ref 22–31)
CREAT SERPL-MCNC: 0.48 MG/DL — LOW (ref 0.5–1.3)
EGFR: 129 ML/MIN/1.73M2 — SIGNIFICANT CHANGE UP
GLUCOSE SERPL-MCNC: 108 MG/DL — HIGH (ref 70–99)
HCT VFR BLD CALC: 35.8 % — SIGNIFICANT CHANGE UP (ref 34.5–45)
HGB BLD-MCNC: 11.9 G/DL — SIGNIFICANT CHANGE UP (ref 11.5–15.5)
MCHC RBC-ENTMCNC: 27.6 PG — SIGNIFICANT CHANGE UP (ref 27–34)
MCHC RBC-ENTMCNC: 33.2 GM/DL — SIGNIFICANT CHANGE UP (ref 32–36)
MCV RBC AUTO: 83.1 FL — SIGNIFICANT CHANGE UP (ref 80–100)
PLATELET # BLD AUTO: 213 K/UL — SIGNIFICANT CHANGE UP (ref 150–400)
POTASSIUM SERPL-MCNC: 3.9 MMOL/L — SIGNIFICANT CHANGE UP (ref 3.5–5.3)
POTASSIUM SERPL-SCNC: 3.9 MMOL/L — SIGNIFICANT CHANGE UP (ref 3.5–5.3)
RBC # BLD: 4.31 M/UL — SIGNIFICANT CHANGE UP (ref 3.8–5.2)
RBC # FLD: 14.5 % — SIGNIFICANT CHANGE UP (ref 10.3–14.5)
RH IG SCN BLD-IMP: POSITIVE — SIGNIFICANT CHANGE UP
SODIUM SERPL-SCNC: 136 MMOL/L — SIGNIFICANT CHANGE UP (ref 135–145)
WBC # BLD: 12.17 K/UL — HIGH (ref 3.8–10.5)
WBC # FLD AUTO: 12.17 K/UL — HIGH (ref 3.8–10.5)

## 2023-09-18 RX ORDER — IBUPROFEN 200 MG
1 TABLET ORAL
Qty: 0 | Refills: 0 | DISCHARGE

## 2023-09-18 RX ORDER — ACETAMINOPHEN 500 MG
2 TABLET ORAL
Qty: 0 | Refills: 0 | DISCHARGE

## 2023-09-18 NOTE — OB PST NOTE - NSICDXPASTSURGICALHX_GEN_ALL_CORE_FT
PAST SURGICAL HISTORY:  H/O      H/O  section     H/O endoscopy     H/O unilateral salpingectomy

## 2023-09-18 NOTE — OB PST NOTE - PROBLEM SELECTOR PLAN 1
Pre-op instructions provided. Medication instructions per OB. Pt verbalized understanding.   Pt given detailed verbal and written instructions on chlorhexidine wash. Pt verbalized understanding with teachback.

## 2023-09-18 NOTE — OB PST NOTE - NSHPREVIEWOFSYSTEMS_GEN_ALL_CORE
General: No fever, chills, sweating, anorexia, . No polyphagia, polyurea, polydypsia.    Skin: No rashes, itching, or dryness. No change in size/color of moles.     Breast: No tenderness, lumps, or nipple discharge      Ophthalmologic: No diplopia, photophobia, lacrimation, blurred Vision , or eye discharge    ENMT Symptoms: No hearing difficulty, ear pain, tinnitus, or vertigo. No sinus symptoms, nasal congestion, nasal   discharge, or nasal obstruction    Respiratory and Thorax: No wheezing, dyspnea, cough, hemoptysis, or pleuritic chest pain     Cardiovascular: No chest pain, palpitations, dyspnea on exertion, orthopnea, paroxysmal nocturnal dyspnea,   peripheral edema, or claudication    Gastrointestinal: No nausea, vomiting, diarrhea, constipation, change in bowel habits, flatulence, abdominal pain, or melena    Genitourinary/ Pelvis: No hematuria, renal colic, or flank pain.  No urine discoloration, incontinence, dysuria, or urinary hesitancy. Normal urinary frequency. No nocturia, abnormal vaginal bleeding, vaginal discharge, spotting, pelvic pain, or vaginal leakage    Musculoskeletal: Some muscle cramps. No arthralgia, arthritis, joint swelling, muscle weakness, neck pain, arm pain, or leg pain    Neurological: No transient paralysis, weakness, paresthesias, or seizures. No syncope, tremors, vertigo, loss of sensation, difficulty walking, loss of consciousness, hemiparesis, confusion, or facial palsy    Psychiatric: No suicidal ideation, depression, anxiety, insomnia, memory loss, paranoia, mood swings, agitation, hallucinations, or hyperactivity    Hematology: No gum bleeding, nose bleeding, or skin lumps    Lymphatic: No enlarged or tender lymph nodes. No extremity swelling    Endocrine: No heat or cold intolerance    Immunologic: No recurrent or persistent infections

## 2023-09-18 NOTE — OB PST NOTE - HISTORY OF PRESENT ILLNESS
33 year old female present today for presurgical evaluation for ...  Pt reports having gestational diabetes - diet controlled.   Pt reports good fetal movement.  33 year old female present today for presurgical evaluation for Repeat  Section.   Pt reports having gestational diabetes - diet controlled.   Pt reports good fetal movement.     Per worksheet, "Sono done 3/24/2023 confirms GARY 10/3/2023, HgB E Carrier - FOB negative, GDM"

## 2023-09-21 ENCOUNTER — APPOINTMENT (OUTPATIENT)
Dept: OBGYN | Facility: CLINIC | Age: 33
End: 2023-09-21
Payer: MEDICAID

## 2023-09-21 VITALS
HEIGHT: 62 IN | BODY MASS INDEX: 29.08 KG/M2 | DIASTOLIC BLOOD PRESSURE: 63 MMHG | SYSTOLIC BLOOD PRESSURE: 103 MMHG | WEIGHT: 158 LBS

## 2023-09-21 PROCEDURE — 99213 OFFICE O/P EST LOW 20 MIN: CPT | Mod: TH

## 2023-09-25 ENCOUNTER — TRANSCRIPTION ENCOUNTER (OUTPATIENT)
Age: 33
End: 2023-09-25

## 2023-09-26 ENCOUNTER — APPOINTMENT (OUTPATIENT)
Dept: OBGYN | Facility: HOSPITAL | Age: 33
End: 2023-09-26

## 2023-09-26 ENCOUNTER — TRANSCRIPTION ENCOUNTER (OUTPATIENT)
Age: 33
End: 2023-09-26

## 2023-09-26 ENCOUNTER — INPATIENT (INPATIENT)
Facility: HOSPITAL | Age: 33
LOS: 2 days | Discharge: HOME | End: 2023-09-29
Attending: OBSTETRICS & GYNECOLOGY | Admitting: OBSTETRICS & GYNECOLOGY
Payer: MEDICAID

## 2023-09-26 VITALS — TEMPERATURE: 98 F

## 2023-09-26 DIAGNOSIS — Z87.59 PERSONAL HISTORY OF OTHER COMPLICATIONS OF PREGNANCY, CHILDBIRTH AND THE PUERPERIUM: Chronic | ICD-10-CM

## 2023-09-26 DIAGNOSIS — Z98.890 OTHER SPECIFIED POSTPROCEDURAL STATES: Chronic | ICD-10-CM

## 2023-09-26 DIAGNOSIS — O34.211 MATERNAL CARE FOR LOW TRANSVERSE SCAR FROM PREVIOUS CESAREAN DELIVERY: ICD-10-CM

## 2023-09-26 DIAGNOSIS — Z98.891 HISTORY OF UTERINE SCAR FROM PREVIOUS SURGERY: Chronic | ICD-10-CM

## 2023-09-26 DIAGNOSIS — Z90.79 ACQUIRED ABSENCE OF OTHER GENITAL ORGAN(S): Chronic | ICD-10-CM

## 2023-09-26 LAB
BASOPHILS # BLD AUTO: 0.08 K/UL — SIGNIFICANT CHANGE UP (ref 0–0.2)
BASOPHILS NFR BLD AUTO: 0.7 % — SIGNIFICANT CHANGE UP (ref 0–2)
BLD GP AB SCN SERPL QL: NEGATIVE — SIGNIFICANT CHANGE UP
EOSINOPHIL # BLD AUTO: 0.05 K/UL — SIGNIFICANT CHANGE UP (ref 0–0.5)
EOSINOPHIL NFR BLD AUTO: 0.4 % — SIGNIFICANT CHANGE UP (ref 0–6)
GLUCOSE BLDC GLUCOMTR-MCNC: 85 MG/DL — SIGNIFICANT CHANGE UP (ref 70–99)
HCT VFR BLD CALC: 38.7 % — SIGNIFICANT CHANGE UP (ref 34.5–45)
HGB BLD-MCNC: 12.9 G/DL — SIGNIFICANT CHANGE UP (ref 11.5–15.5)
IANC: 8.08 K/UL — HIGH (ref 1.8–7.4)
IMM GRANULOCYTES NFR BLD AUTO: 3.7 % — HIGH (ref 0–0.9)
LYMPHOCYTES # BLD AUTO: 1.88 K/UL — SIGNIFICANT CHANGE UP (ref 1–3.3)
LYMPHOCYTES # BLD AUTO: 16.6 % — SIGNIFICANT CHANGE UP (ref 13–44)
MCHC RBC-ENTMCNC: 27.6 PG — SIGNIFICANT CHANGE UP (ref 27–34)
MCHC RBC-ENTMCNC: 33.3 GM/DL — SIGNIFICANT CHANGE UP (ref 32–36)
MCV RBC AUTO: 82.9 FL — SIGNIFICANT CHANGE UP (ref 80–100)
MONOCYTES # BLD AUTO: 0.8 K/UL — SIGNIFICANT CHANGE UP (ref 0–0.9)
MONOCYTES NFR BLD AUTO: 7.1 % — SIGNIFICANT CHANGE UP (ref 2–14)
NEUTROPHILS # BLD AUTO: 8.08 K/UL — HIGH (ref 1.8–7.4)
NEUTROPHILS NFR BLD AUTO: 71.5 % — SIGNIFICANT CHANGE UP (ref 43–77)
NRBC # BLD: 0 /100 WBCS — SIGNIFICANT CHANGE UP (ref 0–0)
NRBC # FLD: 0.03 K/UL — HIGH (ref 0–0)
PLATELET # BLD AUTO: 214 K/UL — SIGNIFICANT CHANGE UP (ref 150–400)
RBC # BLD: 4.67 M/UL — SIGNIFICANT CHANGE UP (ref 3.8–5.2)
RBC # FLD: 14.7 % — HIGH (ref 10.3–14.5)
RH IG SCN BLD-IMP: POSITIVE — SIGNIFICANT CHANGE UP
T PALLIDUM AB TITR SER: NEGATIVE — SIGNIFICANT CHANGE UP
WBC # BLD: 11.31 K/UL — HIGH (ref 3.8–10.5)
WBC # FLD AUTO: 11.31 K/UL — HIGH (ref 3.8–10.5)

## 2023-09-26 PROCEDURE — 59514 CESAREAN DELIVERY ONLY: CPT | Mod: U9,GC

## 2023-09-26 DEVICE — SURGICEL SNOW 2 X 4": Type: IMPLANTABLE DEVICE | Status: FUNCTIONAL

## 2023-09-26 RX ORDER — MAGNESIUM HYDROXIDE 400 MG/1
30 TABLET, CHEWABLE ORAL
Refills: 0 | Status: DISCONTINUED | OUTPATIENT
Start: 2023-09-26 | End: 2023-09-29

## 2023-09-26 RX ORDER — TETANUS TOXOID, REDUCED DIPHTHERIA TOXOID AND ACELLULAR PERTUSSIS VACCINE, ADSORBED 5; 2.5; 8; 8; 2.5 [IU]/.5ML; [IU]/.5ML; UG/.5ML; UG/.5ML; UG/.5ML
0.5 SUSPENSION INTRAMUSCULAR ONCE
Refills: 0 | Status: DISCONTINUED | OUTPATIENT
Start: 2023-09-26 | End: 2023-09-29

## 2023-09-26 RX ORDER — OXYTOCIN 10 UNIT/ML
333.33 VIAL (ML) INJECTION
Qty: 20 | Refills: 0 | Status: DISCONTINUED | OUTPATIENT
Start: 2023-09-26 | End: 2023-09-28

## 2023-09-26 RX ORDER — CITRIC ACID/SODIUM CITRATE 300-500 MG
30 SOLUTION, ORAL ORAL ONCE
Refills: 0 | Status: COMPLETED | OUTPATIENT
Start: 2023-09-26 | End: 2023-09-26

## 2023-09-26 RX ORDER — OXYCODONE HYDROCHLORIDE 5 MG/1
10 TABLET ORAL
Refills: 0 | Status: DISCONTINUED | OUTPATIENT
Start: 2023-09-26 | End: 2023-09-26

## 2023-09-26 RX ORDER — OXYCODONE HYDROCHLORIDE 5 MG/1
5 TABLET ORAL ONCE
Refills: 0 | Status: DISCONTINUED | OUTPATIENT
Start: 2023-09-26 | End: 2023-09-29

## 2023-09-26 RX ORDER — ACETAMINOPHEN 500 MG
975 TABLET ORAL
Refills: 0 | Status: DISCONTINUED | OUTPATIENT
Start: 2023-09-26 | End: 2023-09-29

## 2023-09-26 RX ORDER — SODIUM CHLORIDE 9 MG/ML
1000 INJECTION, SOLUTION INTRAVENOUS
Refills: 0 | Status: DISCONTINUED | OUTPATIENT
Start: 2023-09-26 | End: 2023-09-28

## 2023-09-26 RX ORDER — NALBUPHINE HYDROCHLORIDE 10 MG/ML
2.5 INJECTION, SOLUTION INTRAMUSCULAR; INTRAVENOUS; SUBCUTANEOUS EVERY 6 HOURS
Refills: 0 | Status: DISCONTINUED | OUTPATIENT
Start: 2023-09-26 | End: 2023-09-28

## 2023-09-26 RX ORDER — ONDANSETRON 8 MG/1
4 TABLET, FILM COATED ORAL EVERY 6 HOURS
Refills: 0 | Status: DISCONTINUED | OUTPATIENT
Start: 2023-09-26 | End: 2023-09-28

## 2023-09-26 RX ORDER — DIPHENHYDRAMINE HCL 50 MG
25 CAPSULE ORAL EVERY 6 HOURS
Refills: 0 | Status: DISCONTINUED | OUTPATIENT
Start: 2023-09-26 | End: 2023-09-29

## 2023-09-26 RX ORDER — ONDANSETRON 8 MG/1
4 TABLET, FILM COATED ORAL ONCE
Refills: 0 | Status: DISCONTINUED | OUTPATIENT
Start: 2023-09-26 | End: 2023-09-28

## 2023-09-26 RX ORDER — SODIUM CHLORIDE 9 MG/ML
1000 INJECTION, SOLUTION INTRAVENOUS ONCE
Refills: 0 | Status: DISCONTINUED | OUTPATIENT
Start: 2023-09-26 | End: 2023-09-28

## 2023-09-26 RX ORDER — IBUPROFEN 200 MG
600 TABLET ORAL EVERY 6 HOURS
Refills: 0 | Status: COMPLETED | OUTPATIENT
Start: 2023-09-26 | End: 2024-08-24

## 2023-09-26 RX ORDER — DEXAMETHASONE 0.5 MG/5ML
4 ELIXIR ORAL EVERY 6 HOURS
Refills: 0 | Status: DISCONTINUED | OUTPATIENT
Start: 2023-09-26 | End: 2023-09-28

## 2023-09-26 RX ORDER — OXYCODONE HYDROCHLORIDE 5 MG/1
5 TABLET ORAL
Refills: 0 | Status: DISCONTINUED | OUTPATIENT
Start: 2023-09-26 | End: 2023-09-29

## 2023-09-26 RX ORDER — SODIUM CHLORIDE 9 MG/ML
500 INJECTION, SOLUTION INTRAVENOUS ONCE
Refills: 0 | Status: DISCONTINUED | OUTPATIENT
Start: 2023-09-26 | End: 2023-09-28

## 2023-09-26 RX ORDER — SIMETHICONE 80 MG/1
80 TABLET, CHEWABLE ORAL EVERY 4 HOURS
Refills: 0 | Status: DISCONTINUED | OUTPATIENT
Start: 2023-09-26 | End: 2023-09-29

## 2023-09-26 RX ORDER — NALOXONE HYDROCHLORIDE 4 MG/.1ML
0.1 SPRAY NASAL
Refills: 0 | Status: DISCONTINUED | OUTPATIENT
Start: 2023-09-26 | End: 2023-09-28

## 2023-09-26 RX ORDER — FAMOTIDINE 10 MG/ML
20 INJECTION INTRAVENOUS ONCE
Refills: 0 | Status: COMPLETED | OUTPATIENT
Start: 2023-09-26 | End: 2023-09-26

## 2023-09-26 RX ORDER — SODIUM CHLORIDE 9 MG/ML
1000 INJECTION, SOLUTION INTRAVENOUS
Refills: 0 | Status: DISCONTINUED | OUTPATIENT
Start: 2023-09-26 | End: 2023-09-29

## 2023-09-26 RX ORDER — SODIUM CHLORIDE 9 MG/ML
500 INJECTION, SOLUTION INTRAVENOUS ONCE
Refills: 0 | Status: DISCONTINUED | OUTPATIENT
Start: 2023-09-26 | End: 2023-09-29

## 2023-09-26 RX ORDER — HEPARIN SODIUM 5000 [USP'U]/ML
5000 INJECTION INTRAVENOUS; SUBCUTANEOUS EVERY 12 HOURS
Refills: 0 | Status: DISCONTINUED | OUTPATIENT
Start: 2023-09-26 | End: 2023-09-29

## 2023-09-26 RX ORDER — KETOROLAC TROMETHAMINE 30 MG/ML
30 SYRINGE (ML) INJECTION EVERY 6 HOURS
Refills: 0 | Status: DISCONTINUED | OUTPATIENT
Start: 2023-09-26 | End: 2023-09-27

## 2023-09-26 RX ORDER — OXYCODONE HYDROCHLORIDE 5 MG/1
5 TABLET ORAL
Refills: 0 | Status: DISCONTINUED | OUTPATIENT
Start: 2023-09-26 | End: 2023-09-27

## 2023-09-26 RX ORDER — INFLUENZA VIRUS VACCINE 15; 15; 15; 15 UG/.5ML; UG/.5ML; UG/.5ML; UG/.5ML
0.5 SUSPENSION INTRAMUSCULAR ONCE
Refills: 0 | Status: DISCONTINUED | OUTPATIENT
Start: 2023-09-26 | End: 2023-09-29

## 2023-09-26 RX ORDER — SODIUM CHLORIDE 9 MG/ML
1000 INJECTION, SOLUTION INTRAVENOUS ONCE
Refills: 0 | Status: DISCONTINUED | OUTPATIENT
Start: 2023-09-26 | End: 2023-09-29

## 2023-09-26 RX ORDER — LANOLIN
1 OINTMENT (GRAM) TOPICAL EVERY 6 HOURS
Refills: 0 | Status: DISCONTINUED | OUTPATIENT
Start: 2023-09-26 | End: 2023-09-29

## 2023-09-26 RX ADMIN — FAMOTIDINE 20 MILLIGRAM(S): 10 INJECTION INTRAVENOUS at 12:21

## 2023-09-26 RX ADMIN — Medication 30 MILLILITER(S): at 12:21

## 2023-09-26 RX ADMIN — Medication 30 MILLIGRAM(S): at 21:36

## 2023-09-26 RX ADMIN — Medication 30 MILLIGRAM(S): at 22:15

## 2023-09-26 RX ADMIN — SODIUM CHLORIDE 200 MILLILITER(S): 9 INJECTION, SOLUTION INTRAVENOUS at 12:21

## 2023-09-26 NOTE — DISCHARGE NOTE OB - CARE PLAN
1 Principal Discharge DX:	 delivery delivered  Assessment and plan of treatment:	Routine postoperative care  No heavy lifting  Pelvic rest

## 2023-09-26 NOTE — OB PROVIDER DELIVERY SUMMARY - NSSELHIDDEN_OBGYN_ALL_OB_FT
[NS_DeliveryAttending1_OBGYN_ALL_OB_FT:[id number]==],[NS_DeliveryAssist1_OBGYN_ALL_OB_FT:[id number]],[NS_DeliveryRN_OBGYN_ALL_OB_FT:[id number]]

## 2023-09-26 NOTE — DISCHARGE NOTE OB - CARE PROVIDER_API CALL
Sample-Jocelin Monroy  Obstetrics and Gynecology  [address], [address]  [place] [address]  Phone: [phone]  Fax: [phone]  Follow Up Time:

## 2023-09-26 NOTE — DISCHARGE NOTE OB - MATERIALS PROVIDED
Vaccinations/[facility]  Screening Program/  Immunization Record/Guide to Postpartum Care/[facility] Hearing Screen Program/Shaken Baby Prevention Handout/Birth Certificate Instructions

## 2023-09-26 NOTE — DISCHARGE NOTE OB - PATIENT PORTAL LINK FT
You can access the FollowMyHealth Patient Portal offered by [facility] by registering at the following website: [email or web address]. By joining [employer]’s FollowMyHealth portal, you will also be able to view your health information using other applications (apps) compatible with our system.

## 2023-09-26 NOTE — OB RN DELIVERY SUMMARY - NSSELHIDDEN_OBGYN_ALL_OB_FT
[NS_DeliveryAttending1_OBGYN_ALL_OB_FT:[id number]==],[NS_DeliveryAssist1_OBGYN_ALL_OB_FT:[id number]=],[NS_DeliveryRN_OBGYN_ALL_OB_FT:[id number]==]

## 2023-09-26 NOTE — DISCHARGE NOTE OB - MEDICATION SUMMARY - MEDICATIONS TO STOP TAKING
I will STOP taking the medications listed below when I get home from the hospital:    ondansetron 4 mg oral tablet  -- 1 by mouth every 6 hours as needed for  nausea

## 2023-09-26 NOTE — OB PROVIDER DELIVERY SUMMARY - NSPROVIDERDELIVERYNOTE_OBGYN_ALL_OB_FT
scheduled repeat LTCS at 39w, uncomplicated  viable female infant, vertex presentation, weight 3146g, Apgars 9/9, cord gasses sent  Grossly normal fallopian tubes, uterus, and ovaries  Filmy adhesions between bladder and lower uterine segment  IM Methergine x1 and additional IV Pitocin given for intermittent atony with improvement in tone noted  Surgicel powder applied over surgical bed    EBL: 490  IVF: 2000  UOP: 100    Dictation #_______    Yadi PGY2  W/ Dr Ma scheduled repeat LTCS at 39w, uncomplicated  viable female infant, vertex presentation, weight 3146g, Apgars 9/9, cord gasses sent  Grossly normal fallopian tubes, uterus, and ovaries  Filmy adhesions between bladder and lower uterine segment  IM Methergine x1 and additional IV Pitocin given for intermittent atony with improvement in tone noted  Surgicel powder applied over surgical bed    EBL: 490  IVF: 2000  UOP: 100    Dictation #[id number]    Yadi JAMESY2  W/ Dr Ma

## 2023-09-26 NOTE — OB RN PREOPERATIVE CHECKLIST - NS_PREOPMEDADMIN_OBGYN_ALL_OB
Pt with decreased appetite and nausea for several weeks. Today with very heavy period. Bleeding through Super tampons several times. Yes, see eMAR

## 2023-09-26 NOTE — OB PROVIDER H&P - ASSESSMENT
34yo  @ 39w  Dx: Scheduled rLTCS  Dx: GDMA1    - Admit to L&D  - NPO  - EFM/TOCO  - IV access, CBC/T&C/RPR  - Pepcid and Bicitra as per pre-op policy  - Anesthesia consult   - Blood transfusion consent  - Operative Consent to be discussed and obtained by Dr. Ada Monroy  - Plan to move to OR on time schedule  - Discussed with Dr. Ada Gonzalez, PAC   34yo  @ 39w  Dx: Scheduled rLTCS  Dx: GDMA1    - Admit to L&D  - NPO  - FS 85  - EFM/TOCO  - IV access, CBC/T&C/RPR  - Pepcid and Bicitra as per pre-op policy  - Anesthesia consult   - Blood transfusion consent  - Operative Consent to be discussed and obtained by Dr. Ada Monroy  - Plan to move to OR on time schedule  - Discussed with Dr. Ada Gonzalez, PAC

## 2023-09-26 NOTE — OB PROVIDER H&P - HISTORY OF PRESENT ILLNESS
34yo female  GARY 10/3/23 presents @39 weeks for scheduled rLTCS. Denies shortness of breath, fever, chills or cough.  Denies vaginal bleeding, leakage of fluids, or contractions today. Reports positive fetal movement.    Antepartum Course: anatomy scan wnl, failed GCT/GTT, GBS negative 23, NIPT low risk, failed GCT & GTT, Gestational diabetes, diet controlled. Pt carrier for HgB E, FOB negative   Prenatal labs:  -T&S: A+  -Rubella: NOT Immune  -Hep B: Nonreactive  -HIV: Nonreactive  -RPR: Negative  Ultrasounds: Last sonogram () 6#1

## 2023-09-26 NOTE — OB PROVIDER H&P - NS_CONSENTSAPP_OBGYN_ALL_OB
N/A Sotyktu Pregnancy And Lactation Text: There is insufficient data to evaluate whether or not Sotyktu is safe to use during pregnancy.   It is not known if Sotyktu passes into breast milk and whether or not it is safe to use when breastfeeding.

## 2023-09-26 NOTE — OB PROVIDER H&P - NS_OBGYNHISTORY_OBGYN_ALL_OB_FT
: 3/3/17, pLTCS, 39w, arrest @4cm, 6#10, A1  G2: , ectopic Right tube s/p salpingectomy  G3:  SAB, 9w  G4: current    Gyn Hx: Denies fibroids, ovarian cysts, abnormal pap smears : 3/3/17, pLTCS, 39w, arrest @4cm, 6#10, A1  G2: , ectopic Right tube s/p salpingectomy  G3:  SAB, 9w, no d&c  G4: current    Gyn Hx: Denies fibroids, ovarian cysts, abnormal pap smears

## 2023-09-26 NOTE — PROCEDURAL SAFETY CHECKLIST WITH OR WITHOUT SEDATION - NSPREALLERGYSED_GEN_ALL_CORE
done I will SWITCH the dose or number of times a day I take the medications listed below when I get home from the hospital:    Xarelto  -- 20 milligram(s) by mouth once a day    gabapentin  -- milligram(s) by mouth

## 2023-09-26 NOTE — DISCHARGE NOTE OB - NS MD DC FALL RISK RISK
For information on Fall & Injury Prevention, visit: [email or web address] OR  [email or web address] OR  https://www.cdc.gov/steadi/patient.html

## 2023-09-26 NOTE — OB RN INTRAOPERATIVE NOTE - NSSELHIDDEN_OBGYN_ALL_OB_FT
[NS_DeliveryAttending1_OBGYN_ALL_OB_FT:[id number]==],[NS_DeliveryAssist1_OBGYN_ALL_OB_FT:[id number]],[NS_DeliveryRN_OBGYN_ALL_OB_FT:[id number]]

## 2023-09-26 NOTE — OB RN DELIVERY SUMMARY - BABY A: APGAR 1 MIN HEART RATE, DELIVERY
Physical Therapy Treatment    Patient Name:  Hernandez Rosales   MRN:  [id number]    Recommendations:     Discharge Recommendations:  home   Discharge Equipment Recommendations: none   Barriers to discharge: None    Assessment:     Hernandez Rosales is a 39 y.o. male admitted with a medical diagnosis of History of simultaneous kidney and pancreas transplant.  He presents with the following impairments/functional limitations:  impaired endurance, impaired self care skills, impaired functional mobilty, gait instability, impaired balance .Pt presents with improved overall functional mobility requiring decreased assistance and increased activity tolerance to perform functional mobility.Pt would continue to benefit from skilled PT to address overall functional mobility and goals. Goals remain appropriate.      Rehab Prognosis: Good; patient would benefit from acute skilled PT services to address these deficits and reach maximum level of function.    Recent Surgery: Procedure(s) (LRB):  TRANSPLANT, KIDNEY (N/A)  TRANSPLANT, PANCREAS (N/A) 3 Days Post-Op    Plan:     During this hospitalization, patient to be seen 4 x/week to address the identified rehab impairments via gait training, therapeutic activities, therapeutic exercises and progress toward the following goals:    · Plan of Care Expires:  12/03/20    Subjective     Patient/Family Comments/goals: my prosthesis is going on right  Pain/Comfort:  · Pain Rating 1: (not rated)  · Location 1: abdomen      Objective:     Communicated with RN prior to session.  Patient found supine with peripheral IV, zamudio catheter upon PT entry to room.     General Precautions: Standard, fall, blind   Orthopedic Precautions:    Braces:       Functional Mobility:  · Bed Mobility:     · Scooting: stand by assistance  · Supine to Sit: stand by assistance  · Transfers:     · Sit to Stand:  contact guard assistance with no AD  · Gait: pt amb with CGA for safety x 200 ft with blind cane with vcs for  guidance      AM-PAC 6 CLICK MOBILITY  Turning over in bed (including adjusting bedclothes, sheets and blankets)?: 3  Sitting down on and standing up from a chair with arms (e.g., wheelchair, bedside commode, etc.): 3  Moving from lying on back to sitting on the side of the bed?: 3  Moving to and from a bed to a chair (including a wheelchair)?: 3  Climbing 3-5 steps with a railing?: 2       Therapeutic Activities and Exercises:   Therapist provided instruction and educated of patient on progress, safety,d/c,PT POC,   proper body mechanics, energy conservation, and fall prevention strategies during tasks listed above, on the effects of prolonged immobility and the importance of performing OOB activity and exercises to promote healing and reduce recovery time   Patient assisted with donning and doffing prosthesis and  sleve   Updated white board with appropriate PT mobility information for medical team notification  Donned an extra gown and R shoe  Pt safe to ambulate in hallway with RN or PCT assistance   Call nursing/pct to transfer to chair/use bathroom. Pt stated understanding  Bedside table in front of patient and area set up for function, convenience, and safety. RN aware of patient's mobility needs and status. Questions/concerns addressed within PTA scope of practice; patient with no further questions. Time was provided for active listening, discussion of health disposition, and discussion of safe discharge. Pt?verbalized?agreement .      Patient left up in chair with all lines intact, call button in reach, nsg notified and sister present..    GOALS:   Multidisciplinary Problems     Physical Therapy Goals        Problem: Physical Therapy Goal    Goal Priority Disciplines Outcome Goal Variances Interventions   Physical Therapy Goal     PT, PT/OT Ongoing, Progressing     Description: Goals to be met by: 12/3/20    Patient will increase functional independence with mobility by performin. Supine to sit with  Stand-by Assistance-not met  2. Sit to stand transfer with Contact Guard Assistance -not met  3. Gait  x 250 feet with Stand-by Assistance using blind cane and LLE BK prosthesis- not met4. Pt min assist don/doff BK prosthesis - not met                     Time Tracking:     PT Received On: 11/06/20  PT Start Time: 1309     PT Stop Time: 1348  PT Total Time (min): 39 min     Billable Minutes: Gait Training 15 and Therapeutic Activity 24    Treatment Type: Treatment  PT/PTA: PTA     PTA Visit Number: 1     Tanner Akins, PTA  11/06/2020   (2) more than 100 beats/min

## 2023-09-26 NOTE — OB RN PATIENT PROFILE - AS SC BRADEN ACTIVITY
Telephone Encounter by Alta Stoll RN at 11/01/17 02:44 PM     Author:  Alta Stoll RN Service:  (none) Author Type:  Registered Nurse     Filed:  11/01/17 02:45 PM Encounter Date:  11/1/2017 Status:  Signed     :  Alta Stoll RN (Registered Nurse)            form placed at  for -mom notified.[GF1.1M]      Revision History        User Key Date/Time User Provider Type Action    > GF1.1 11/01/17 02:45 PM Alta Stoll RN Registered Nurse Sign    M - Manual             (4) walks frequently

## 2023-09-26 NOTE — OB RN PATIENT PROFILE - FALL HARM RISK - ATTEMPT OOB
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 Pain Level (0-10 scale): 6/10    Any medication changes, allergies to medications, adverse drug reactions, diagnosis change, or new procedure performed?: [x] No    [] Yes (see summary sheet for update)    OBJECTIVE  Modality rationale: Patient declined   Min Type Additional Details    [] Estim: []Att   []Unatt  []TENS instruct                 []IFC  []Premod []NMES                       []Other:  []w/US   []w/ice   []w/heat  Position:  Location:    []  Traction: [] Cervical       []Lumbar                       [] Prone          []Supine                       []Intermittent   []Continuous Lbs:  [] before manual  [] after manual    []  Ultrasound: []Continuous   [] Pulsed                           []1MHz   []3MHz Location:  W/cm2:    []  Ice     [x]  heat  []  Ice massage Position: seated  Location: cervical region    []  Vasopneumatic Device Pressure: [] lo [] med [] hi   Temp: [] lo [] med [] hi   [] Skin assessment post-treatment:  []intact []redness- no adverse reaction       []redness - adverse reaction:     40 min Therapeutic Exercise:  [x] See flow sheet :   Rationale: increase ROM, increase strength and improve coordination to improve the patients ability to decrease pain with all activities.             With TE  TA   NR  GT   Misc Patient Education: [x] Review HEP    [] Progressed/Changed HEP based on:   [] positioning   [] body mechanics   [] transfers   [] heat/ice application        Pain Level (0-10 scale) post treatment: 3/10    ASSESSMENT/Changes in Function: Session began with warm up on pulleys. Continued with cervical stretches and strengthening. Resistance continued with rows and shoulder extensions with no adverse effects. Introduced scapular Y's in standing with graded resistance band (green). No manual this date. No modalities this date. Will continue POC working towards pain free movement. Patient will continue to benefit from skilled PT services to modify and progress therapeutic interventions, address functional mobility deficits, address ROM deficits, address strength deficits, analyze and address soft tissue restrictions, analyze and cue movement patterns, analyze and modify body mechanics/ergonomics and assess and modify postural abnormalities to attain remaining goals.      [x]  See Plan of Care  []  See progress note/recertification  []  See Discharge Summary         PLAN  []  Upgrade activities as tolerated     [x]  Continue plan of care  []  Update interventions per flow sheet       []  Discharge due to:_  []  Other:_      SAGE Alfaro  12/14/2021  10:55 AM No

## 2023-09-26 NOTE — OB PROVIDER H&P - NSHPPHYSICALEXAM_GEN_ALL_CORE
Vitals: ICU Vital Signs Last 24 Hrs  T(C): 36.7 (26 Sep 2023 12:00), Max: 36.7 (26 Sep 2023 12:00)  T(F): 98.1 (26 Sep 2023 12:00), Max: 98.1 (26 Sep 2023 12:00)  HR: 98 (26 Sep 2023 12:00) (98 - 98)  BP: 127/69 (26 Sep 2023 12:00) (127/69 - 127/69)  BP(mean): --  ABP: --  ABP(mean): --  RR: 16 (26 Sep 2023 12:00) (16 - 16)  SpO2: --      General: A&O x3  Heart: RRR, S1 & S2  Lungs: CTA b/l, good inspiratory/expiratory effort. No ronchi, no rales  Abdomen: Soft, Gravid, TOCO in place, +pfannenstial scar    NST PENDING  EFM: baseline , moderate variability, +accels, -decels, Category 1, reactive  TOCO: contractions noted, irregular    Extremities: FROM, bilateral 1+ LE edema  Neuro: grossly intact Vitals: ICU Vital Signs Last 24 Hrs  T(C): 36.7 (26 Sep 2023 12:00), Max: 36.7 (26 Sep 2023 12:00)  T(F): 98.1 (26 Sep 2023 12:00), Max: 98.1 (26 Sep 2023 12:00)  HR: 98 (26 Sep 2023 12:00) (98 - 98)  BP: 127/69 (26 Sep 2023 12:00) (127/69 - 127/69)  BP(mean): --  ABP: --  ABP(mean): --  RR: 16 (26 Sep 2023 12:00) (16 - 16)  SpO2: --      General: A&O x3  Heart: RRR, S1 & S2  Lungs: CTA b/l, good inspiratory/expiratory effort. No ronchi, no rales  Abdomen: Soft, Gravid, TOCO in place, +pfannenstial scar    EFM: baseline , moderate variability, +accels, -decels, Category 1, reactive  TOCO: contractions noted, irregular, Q5-7 minutes    Extremities: FROM, bilateral 1+ LE edema  Neuro: grossly intact

## 2023-09-26 NOTE — OB RN PATIENT PROFILE - NS PRO DEPRESSION SCREENING Y/N1
[de-identified] : 30 y/o F with ETD, had BMT 11/18/2021, Right tube replaced 5/31/2021. Redeveloped right effusion and bilat type C tymps and had bilateral T-tube and ET dilation 12/27/22. She notes she is doing well, hearing better. Still has sensation of a bit of fluid in the right ear.  Diabetes no

## 2023-09-26 NOTE — DISCHARGE NOTE OB - MEDICATION SUMMARY - MEDICATIONS TO TAKE
I will START or STAY ON the medications listed below when I get home from the hospital:    ibuprofen 600 mg oral tablet  -- 1 tab(s) by mouth every 6 hours  -- Indication: For maternal pain management    acetaminophen 325 mg oral tablet  -- 3 tab(s) by mouth every 6 hours  -- Indication: For maternal pain management

## 2023-09-26 NOTE — OB PROVIDER DELIVERY SUMMARY - NSLOWPPHRISK_OBGYN_A_OB
[place] Pregnancy/Less than or equal to 4 previous vaginal births/No known bleeding disorder/No history of postpartum hemorrhage

## 2023-09-26 NOTE — OB RN PATIENT PROFILE - COMFORT/ACCEPTABLE PAIN LEVEL (0-10)
KRYSTINA patient in clinic today with elevated blood pressure 158/98, retake 20 minutes later was 160/100, patient stated he took his medication today but was a little nervous about the appointment. Could you follow up with him regarding his elevated BP?   5

## 2023-09-27 LAB
BASOPHILS # BLD AUTO: 0.04 K/UL — SIGNIFICANT CHANGE UP (ref 0–0.2)
BASOPHILS NFR BLD AUTO: 0.3 % — SIGNIFICANT CHANGE UP (ref 0–2)
EOSINOPHIL # BLD AUTO: 0.1 K/UL — SIGNIFICANT CHANGE UP (ref 0–0.5)
EOSINOPHIL NFR BLD AUTO: 0.7 % — SIGNIFICANT CHANGE UP (ref 0–6)
HCT VFR BLD CALC: 33.6 % — LOW (ref 34.5–45)
HGB BLD-MCNC: 11.6 G/DL — SIGNIFICANT CHANGE UP (ref 11.5–15.5)
IANC: 10.31 K/UL — HIGH (ref 1.8–7.4)
IMM GRANULOCYTES NFR BLD AUTO: 1.1 % — HIGH (ref 0–0.9)
LYMPHOCYTES # BLD AUTO: 1.66 K/UL — SIGNIFICANT CHANGE UP (ref 1–3.3)
LYMPHOCYTES # BLD AUTO: 12.4 % — LOW (ref 13–44)
MCHC RBC-ENTMCNC: 28.4 PG — SIGNIFICANT CHANGE UP (ref 27–34)
MCHC RBC-ENTMCNC: 34.5 GM/DL — SIGNIFICANT CHANGE UP (ref 32–36)
MCV RBC AUTO: 82.4 FL — SIGNIFICANT CHANGE UP (ref 80–100)
MONOCYTES # BLD AUTO: 1.12 K/UL — HIGH (ref 0–0.9)
MONOCYTES NFR BLD AUTO: 8.4 % — SIGNIFICANT CHANGE UP (ref 2–14)
NEUTROPHILS # BLD AUTO: 10.31 K/UL — HIGH (ref 1.8–7.4)
NEUTROPHILS NFR BLD AUTO: 77.1 % — HIGH (ref 43–77)
NRBC # BLD: 0 /100 WBCS — SIGNIFICANT CHANGE UP (ref 0–0)
NRBC # FLD: 0.03 K/UL — HIGH (ref 0–0)
PLATELET # BLD AUTO: 208 K/UL — SIGNIFICANT CHANGE UP (ref 150–400)
RBC # BLD: 4.08 M/UL — SIGNIFICANT CHANGE UP (ref 3.8–5.2)
RBC # FLD: 14.3 % — SIGNIFICANT CHANGE UP (ref 10.3–14.5)
WBC # BLD: 13.38 K/UL — HIGH (ref 3.8–10.5)
WBC # FLD AUTO: 13.38 K/UL — HIGH (ref 3.8–10.5)

## 2023-09-27 RX ORDER — IBUPROFEN 200 MG
600 TABLET ORAL EVERY 6 HOURS
Refills: 0 | Status: DISCONTINUED | OUTPATIENT
Start: 2023-09-27 | End: 2023-09-29

## 2023-09-27 RX ADMIN — Medication 975 MILLIGRAM(S): at 14:57

## 2023-09-27 RX ADMIN — Medication 975 MILLIGRAM(S): at 22:00

## 2023-09-27 RX ADMIN — Medication 30 MILLIGRAM(S): at 03:58

## 2023-09-27 RX ADMIN — Medication 975 MILLIGRAM(S): at 00:25

## 2023-09-27 RX ADMIN — Medication 600 MILLIGRAM(S): at 17:40

## 2023-09-27 RX ADMIN — OXYCODONE HYDROCHLORIDE 5 MILLIGRAM(S): 5 TABLET ORAL at 21:46

## 2023-09-27 RX ADMIN — HEPARIN SODIUM 5000 UNIT(S): 5000 INJECTION INTRAVENOUS; SUBCUTANEOUS at 17:39

## 2023-09-27 RX ADMIN — Medication 975 MILLIGRAM(S): at 22:30

## 2023-09-27 RX ADMIN — OXYCODONE HYDROCHLORIDE 5 MILLIGRAM(S): 5 TABLET ORAL at 05:27

## 2023-09-27 RX ADMIN — HEPARIN SODIUM 5000 UNIT(S): 5000 INJECTION INTRAVENOUS; SUBCUTANEOUS at 05:27

## 2023-09-27 RX ADMIN — SIMETHICONE 80 MILLIGRAM(S): 80 TABLET, CHEWABLE ORAL at 22:07

## 2023-09-27 RX ADMIN — Medication 975 MILLIGRAM(S): at 15:30

## 2023-09-27 RX ADMIN — Medication 30 MILLIGRAM(S): at 12:24

## 2023-09-27 RX ADMIN — OXYCODONE HYDROCHLORIDE 5 MILLIGRAM(S): 5 TABLET ORAL at 17:40

## 2023-09-27 RX ADMIN — Medication 30 MILLIGRAM(S): at 11:33

## 2023-09-27 RX ADMIN — Medication 600 MILLIGRAM(S): at 23:59

## 2023-09-27 RX ADMIN — Medication 975 MILLIGRAM(S): at 01:00

## 2023-09-27 RX ADMIN — Medication 600 MILLIGRAM(S): at 18:39

## 2023-09-27 RX ADMIN — Medication 975 MILLIGRAM(S): at 06:00

## 2023-09-27 RX ADMIN — Medication 30 MILLIGRAM(S): at 04:30

## 2023-09-27 RX ADMIN — Medication 975 MILLIGRAM(S): at 05:27

## 2023-09-27 NOTE — PROGRESS NOTE ADULT - SUBJECTIVE AND OBJECTIVE BOX
POST OP DAY  1  s/p   SECTION    SUBJECTIVE:  I'm ok. I have not gotten out of bed much    PAIN SCALE SCORE: [x] Refer to charted pain scores    THERAPY:  [ x ] Spinal morphine   [  ] Epidural morphine   [  ] IV PCA Hydromorphone 1 mg/ml    OBJECTIVE:  Comfortable Appearing    SEDATION SCORE:	  [ x ] Alert	    [  ] Drowsy        [  ] Arousable	[  ] Asleep	[  ] Unresponsive    Side Effects:	  [ x ] None	     [  ] Nausea        [  ] Pruritus        [  ] Weakness   [  ] Numbness        ASSESSMENT/ PLAN   [   ] Discontinue         [  ] Continue    [ x ] Change to prn Analgesics as per primary service.    DOCUMENTATION & VERIFICATION OF CURRENT MEDS [ x ] Done    COMMENTS: No Headache. Encouraged ambulation

## 2023-09-27 NOTE — PROGRESS NOTE ADULT - SUBJECTIVE AND OBJECTIVE BOX
Postpartum Note,  Section  Post op Day 1    Subjective:  The patient feels well.  She is ambulating.   She is tolerating regular diet.  She denies nausea and vomiting.  She is voiding.  Her pain is controlled.  She reports normal postpartum bleeding.    Physical exam:    Vital Signs Last 24 Hrs  T(C): 36.7 (27 Sep 2023 13:29), Max: 36.9 (27 Sep 2023 05:00)  T(F): 98 (27 Sep 2023 13:29), Max: 98.4 (27 Sep 2023 05:00)  HR: 87 (27 Sep 2023 13:29) (61 - 87)  BP: 107/67 (27 Sep 2023 13:29) (94/59 - 110/68)  BP(mean): 74 (26 Sep 2023 19:00) (63 - 79)  RR: 17 (27 Sep 2023 13:29) (12 - 17)  SpO2: 99% (27 Sep 2023 13:29) (97% - 100%)    Parameters below as of 27 Sep 2023 13:29  Patient On (Oxygen Delivery Method): room air        Gen: NAD    Abdomen: Soft, nontender, no distension , firm uterine fundus at umbilicus.  Incision: Clean, dry, and intact   Pelvic: Normal lochia noted  Ext: No calf tenderness    LABS:                        11.6   13.38 )-----------( 208      ( 27 Sep 2023 05:00 )             33.6       Rubella status:     Allergies    Shrimp (Rash)  No Known Drug Allergies    Intolerances      MEDICATIONS  (STANDING):  acetaminophen     Tablet .. 975 milliGRAM(s) Oral <User Schedule>  diphtheria/tetanus/pertussis (acellular) Vaccine (Adacel) 0.5 milliLiter(s) IntraMuscular once  heparin   Injectable 5000 Unit(s) SubCutaneous every 12 hours  ibuprofen  Tablet. 600 milliGRAM(s) Oral every 6 hours  influenza   Vaccine 0.5 milliLiter(s) IntraMuscular once  ketorolac   Injectable 30 milliGRAM(s) IV Push every 6 hours  lactated ringers Bolus 1000 milliLiter(s) IV Bolus once  lactated ringers Bolus 500 milliLiter(s) IV Bolus once  lactated ringers Bolus 500 milliLiter(s) IV Bolus once  lactated ringers Bolus 1000 milliLiter(s) IV Bolus once  lactated ringers Bolus 1000 milliLiter(s) IV Bolus once  lactated ringers Bolus 500 milliLiter(s) IV Bolus once  lactated ringers. 1000 milliLiter(s) (125 mL/Hr) IV Continuous <Continuous>  lactated ringers. 1000 milliLiter(s) (200 mL/Hr) IV Continuous <Continuous>  lactated ringers. 1000 milliLiter(s) (75 mL/Hr) IV Continuous <Continuous>  lactated ringers. 1000 milliLiter(s) (75 mL/Hr) IV Continuous <Continuous>  oxytocin Infusion 333.333 milliUNIT(s)/Min (1000 mL/Hr) IV Continuous <Continuous>    MEDICATIONS  (PRN):  dexAMETHasone  Injectable 4 milliGRAM(s) IV Push every 6 hours PRN Nausea  diphenhydrAMINE 25 milliGRAM(s) Oral every 6 hours PRN Pruritus  lanolin Ointment 1 Application(s) Topical every 6 hours PRN Sore Nipples  magnesium hydroxide Suspension 30 milliLiter(s) Oral two times a day PRN Constipation  nalbuphine Injectable 2.5 milliGRAM(s) IV Push every 6 hours PRN Pruritus  naloxone Injectable 0.1 milliGRAM(s) IV Push every 3 minutes PRN For ANY of the following changes in patient status:  A. Breaths Per Minute LESS THAN 10, B. Oxygen saturation LESS THAN 90%, C. Sedation score of 6 for Stop After: 4 Times  ondansetron Injectable 4 milliGRAM(s) IV Push once PRN Nausea and/or Vomiting  ondansetron Injectable 4 milliGRAM(s) IV Push every 6 hours PRN Nausea  oxyCODONE    IR 10 milliGRAM(s) Oral every 3 hours PRN Moderate Pain (4 - 6)  oxyCODONE    IR 5 milliGRAM(s) Oral every 3 hours PRN Moderate to Severe Pain (4-10)  oxyCODONE    IR 5 milliGRAM(s) Oral once PRN Moderate to Severe Pain (4-10)  oxyCODONE    IR 5 milliGRAM(s) Oral every 3 hours PRN Mild Pain (1 - 3)  simethicone 80 milliGRAM(s) Chew every 4 hours PRN Gas        Assessment and Plan  POD # 1 s/p  section  Doing well.  Encourage ambulation.  DC mcdonald  Saline lock IV

## 2023-09-28 ENCOUNTER — APPOINTMENT (OUTPATIENT)
Dept: OBGYN | Facility: CLINIC | Age: 33
End: 2023-09-28

## 2023-09-28 RX ORDER — IBUPROFEN 200 MG
1 TABLET ORAL
Qty: 0 | Refills: 0 | DISCHARGE
Start: 2023-09-28

## 2023-09-28 RX ORDER — FERROUS SULFATE 325(65) MG
325 TABLET ORAL DAILY
Refills: 0 | Status: DISCONTINUED | OUTPATIENT
Start: 2023-09-28 | End: 2023-09-29

## 2023-09-28 RX ORDER — FERROUS SULFATE 325(65) MG
0 TABLET ORAL
Refills: 0 | DISCHARGE

## 2023-09-28 RX ORDER — SENNA PLUS 8.6 MG/1
2 TABLET ORAL AT BEDTIME
Refills: 0 | Status: DISCONTINUED | OUTPATIENT
Start: 2023-09-28 | End: 2023-09-29

## 2023-09-28 RX ORDER — CALCIUM CARBONATE 500(1250)
1 TABLET ORAL
Refills: 0 | DISCHARGE

## 2023-09-28 RX ORDER — ONDANSETRON 8 MG/1
1 TABLET, FILM COATED ORAL
Refills: 0 | DISCHARGE

## 2023-09-28 RX ORDER — ACETAMINOPHEN 500 MG
3 TABLET ORAL
Qty: 0 | Refills: 0 | DISCHARGE
Start: 2023-09-28

## 2023-09-28 RX ADMIN — Medication 975 MILLIGRAM(S): at 15:10

## 2023-09-28 RX ADMIN — Medication 600 MILLIGRAM(S): at 17:43

## 2023-09-28 RX ADMIN — Medication 975 MILLIGRAM(S): at 15:41

## 2023-09-28 RX ADMIN — Medication 600 MILLIGRAM(S): at 06:12

## 2023-09-28 RX ADMIN — Medication 975 MILLIGRAM(S): at 09:31

## 2023-09-28 RX ADMIN — SIMETHICONE 80 MILLIGRAM(S): 80 TABLET, CHEWABLE ORAL at 12:14

## 2023-09-28 RX ADMIN — HEPARIN SODIUM 5000 UNIT(S): 5000 INJECTION INTRAVENOUS; SUBCUTANEOUS at 17:44

## 2023-09-28 RX ADMIN — Medication 975 MILLIGRAM(S): at 09:20

## 2023-09-28 RX ADMIN — Medication 600 MILLIGRAM(S): at 12:31

## 2023-09-28 RX ADMIN — Medication 975 MILLIGRAM(S): at 21:50

## 2023-09-28 RX ADMIN — Medication 975 MILLIGRAM(S): at 21:16

## 2023-09-28 RX ADMIN — Medication 600 MILLIGRAM(S): at 23:45

## 2023-09-28 RX ADMIN — HEPARIN SODIUM 5000 UNIT(S): 5000 INJECTION INTRAVENOUS; SUBCUTANEOUS at 06:12

## 2023-09-28 RX ADMIN — Medication 1 TABLET(S): at 12:15

## 2023-09-28 RX ADMIN — SIMETHICONE 80 MILLIGRAM(S): 80 TABLET, CHEWABLE ORAL at 06:12

## 2023-09-28 RX ADMIN — Medication 600 MILLIGRAM(S): at 00:30

## 2023-09-28 RX ADMIN — Medication 975 MILLIGRAM(S): at 03:50

## 2023-09-28 RX ADMIN — Medication 600 MILLIGRAM(S): at 12:14

## 2023-09-28 RX ADMIN — Medication 325 MILLIGRAM(S): at 12:15

## 2023-09-28 RX ADMIN — Medication 975 MILLIGRAM(S): at 03:10

## 2023-09-28 NOTE — PROGRESS NOTE ADULT - SUBJECTIVE AND OBJECTIVE BOX
OB Attending Progress Note: TLCS, POD#2    S: 34yo POD#2 s/p LTCS. Her pain is well controlled. She is tolerating a regular diet and passing flatus. Voiding spontaneously. Denies N/V/D. Denies CP/SOB/lightheadedness/dizziness. She is breastfeeding.    O:  Vitals:  Vital Signs Last 24 Hrs  T(C): 36.4 (28 Sep 2023 13:45), Max: 37 (27 Sep 2023 22:33)  T(F): 97.6 (28 Sep 2023 13:45), Max: 98.6 (27 Sep 2023 22:33)  HR: 92 (28 Sep 2023 13:45) (82 - 92)  BP: 94/60 (28 Sep 2023 13:45) (94/60 - 99/66)  BP(mean): --  RR: 17 (28 Sep 2023 13:45) (16 - 17)  SpO2: 96% (28 Sep 2023 13:45) (96% - 100%)    Parameters below as of 28 Sep 2023 13:45  Patient On (Oxygen Delivery Method): room air        MEDICATIONS  (STANDING):  acetaminophen     Tablet .. 975 milliGRAM(s) Oral <User Schedule>  diphtheria/tetanus/pertussis (acellular) Vaccine (Adacel) 0.5 milliLiter(s) IntraMuscular once  ferrous    sulfate 325 milliGRAM(s) Oral daily  heparin   Injectable 5000 Unit(s) SubCutaneous every 12 hours  ibuprofen  Tablet. 600 milliGRAM(s) Oral every 6 hours  influenza   Vaccine 0.5 milliLiter(s) IntraMuscular once  lactated ringers Bolus 500 milliLiter(s) IV Bolus once  lactated ringers Bolus 1000 milliLiter(s) IV Bolus once  lactated ringers. 1000 milliLiter(s) (125 mL/Hr) IV Continuous <Continuous>  prenatal multivitamin 1 Tablet(s) Oral daily  senna 2 Tablet(s) Oral at bedtime      MEDICATIONS  (PRN):  diphenhydrAMINE 25 milliGRAM(s) Oral every 6 hours PRN Pruritus  lanolin Ointment 1 Application(s) Topical every 6 hours PRN Sore Nipples  magnesium hydroxide Suspension 30 milliLiter(s) Oral two times a day PRN Constipation  oxyCODONE    IR 5 milliGRAM(s) Oral once PRN Moderate to Severe Pain (4-10)  oxyCODONE    IR 5 milliGRAM(s) Oral every 3 hours PRN Moderate to Severe Pain (4-10)  simethicone 80 milliGRAM(s) Chew every 4 hours PRN Gas      Labs:  Blood type: A Positive  Rubella IgG: RPR: Negative                          11.6   13.38<H> >-----------< 208    ( 09-27 @ 05:00 )             33.6<L>                        12.9   11.31<H> >-----------< 214    ( 09-26 @ 11:50 )             38.7                  PE:  General: NAD  CV: RR  Pulm: Breathing comfortably on RA  Abdomen: Soft, appropriately tender, incision c/d/i.  Extremities: No erythema, no pitting edema    A/P: 34yo POD#2 s/p LTCS.  - Continue regular diet.  - Increase ambulation.  - Continue motrin, tylenol, oxycodone PRN for pain control.  - Discharge planning tomorrow    Escobar Eisenberg MD

## 2023-09-29 VITALS
SYSTOLIC BLOOD PRESSURE: 111 MMHG | OXYGEN SATURATION: 99 % | RESPIRATION RATE: 18 BRPM | DIASTOLIC BLOOD PRESSURE: 65 MMHG | TEMPERATURE: 98 F | HEART RATE: 72 BPM

## 2023-09-29 RX ADMIN — Medication 975 MILLIGRAM(S): at 15:57

## 2023-09-29 RX ADMIN — Medication 600 MILLIGRAM(S): at 00:30

## 2023-09-29 RX ADMIN — Medication 975 MILLIGRAM(S): at 03:30

## 2023-09-29 RX ADMIN — HEPARIN SODIUM 5000 UNIT(S): 5000 INJECTION INTRAVENOUS; SUBCUTANEOUS at 06:21

## 2023-09-29 RX ADMIN — Medication 975 MILLIGRAM(S): at 10:01

## 2023-09-29 RX ADMIN — Medication 975 MILLIGRAM(S): at 09:03

## 2023-09-29 RX ADMIN — Medication 600 MILLIGRAM(S): at 13:15

## 2023-09-29 RX ADMIN — Medication 975 MILLIGRAM(S): at 02:49

## 2023-09-29 RX ADMIN — Medication 600 MILLIGRAM(S): at 06:19

## 2023-09-29 RX ADMIN — Medication 600 MILLIGRAM(S): at 06:59

## 2023-09-29 RX ADMIN — SIMETHICONE 80 MILLIGRAM(S): 80 TABLET, CHEWABLE ORAL at 06:20

## 2023-09-29 RX ADMIN — Medication 600 MILLIGRAM(S): at 12:30

## 2023-09-29 NOTE — PROGRESS NOTE ADULT - SUBJECTIVE AND OBJECTIVE BOX
S: 34yo POD#3 s/p schedule repeat LTCS. The patient feels well.  Pain is well controlled. She is tolerating a regular diet and passing flatus. She is voiding spontaneously, and ambulating without difficulty. Denies CP/SOB. Denies lightheadedness/dizziness. Denies N/V.    O:  Vitals:  Vital Signs Last 24 Hrs  T(C): 36.3 (29 Sep 2023 06:25), Max: 37 (28 Sep 2023 21:49)  T(F): 97.4 (29 Sep 2023 06:25), Max: 98.6 (28 Sep 2023 21:49)  HR: 75 (29 Sep 2023 06:25) (75 - 92)  BP: 103/68 (29 Sep 2023 06:25) (94/60 - 105/65)  BP(mean): --  RR: 17 (29 Sep 2023 06:25) (16 - 17)  SpO2: 99% (29 Sep 2023 06:25) (96% - 100%)    Parameters below as of 29 Sep 2023 06:25  Patient On (Oxygen Delivery Method): room air        MEDICATIONS  (STANDING):  acetaminophen     Tablet .. 975 milliGRAM(s) Oral <User Schedule>  diphtheria/tetanus/pertussis (acellular) Vaccine (Adacel) 0.5 milliLiter(s) IntraMuscular once  ferrous    sulfate 325 milliGRAM(s) Oral daily  heparin   Injectable 5000 Unit(s) SubCutaneous every 12 hours  ibuprofen  Tablet. 600 milliGRAM(s) Oral every 6 hours  influenza   Vaccine 0.5 milliLiter(s) IntraMuscular once  lactated ringers Bolus 1000 milliLiter(s) IV Bolus once  lactated ringers Bolus 500 milliLiter(s) IV Bolus once  lactated ringers. 1000 milliLiter(s) (125 mL/Hr) IV Continuous <Continuous>  prenatal multivitamin 1 Tablet(s) Oral daily  senna 2 Tablet(s) Oral at bedtime    MEDICATIONS  (PRN):  diphenhydrAMINE 25 milliGRAM(s) Oral every 6 hours PRN Pruritus  lanolin Ointment 1 Application(s) Topical every 6 hours PRN Sore Nipples  magnesium hydroxide Suspension 30 milliLiter(s) Oral two times a day PRN Constipation  oxyCODONE    IR 5 milliGRAM(s) Oral once PRN Moderate to Severe Pain (4-10)  oxyCODONE    IR 5 milliGRAM(s) Oral every 3 hours PRN Moderate to Severe Pain (4-10)  simethicone 80 milliGRAM(s) Chew every 4 hours PRN Gas      LABS:  Blood type: A Positive  Rubella IgG: RPR: Negative                          11.6   13.38<H> >-----------< 208    ( 09-27 @ 05:00 )             33.6<L>                        12.9   11.31<H> >-----------< 214    ( 09-26 @ 11:50 )             38.7    Physical exam:  Gen: NAD  Abdomen: Soft, nontender, no distension , firm uterine fundus at umbilicus.  Incision: Clean, dry, and intact with steristrips  Pelvic: Normal lochia noted  Ext: No calf tenderness    A/P: 34yo POD#3 s/p scheduled repeat LTCS.  Patient is stable and is doing well post-operatively.  - Continue motrin, tylenol, oxycodone PRN for pain control.  - Increase ambulation  - Encouraged use of abdominal binder  - Continue regular diet  - GTT in 6 weeks for gdma1  - Discharge planning    Lourdes EMMANUEL         S: 34yo POD#3 s/p schedule repeat LTCS. The patient feels well.  Pain is well controlled. She is tolerating a regular diet and passing flatus. She is voiding spontaneously, and ambulating without difficulty. Denies CP/SOB. Denies lightheadedness/dizziness. Denies N/V.    O:  Vitals:  Vital Signs Last 24 Hrs  T(C): 36.3 (29 Sep 2023 06:25), Max: 37 (28 Sep 2023 21:49)  T(F): 97.4 (29 Sep 2023 06:25), Max: 98.6 (28 Sep 2023 21:49)  HR: 75 (29 Sep 2023 06:25) (75 - 92)  BP: 103/68 (29 Sep 2023 06:25) (94/60 - 105/65)  BP(mean): --  RR: 17 (29 Sep 2023 06:25) (16 - 17)  SpO2: 99% (29 Sep 2023 06:25) (96% - 100%)    Parameters below as of 29 Sep 2023 06:25  Patient On (Oxygen Delivery Method): room air        MEDICATIONS  (STANDING):  acetaminophen     Tablet .. 975 milliGRAM(s) Oral <User Schedule>  diphtheria/tetanus/pertussis (acellular) Vaccine (Adacel) 0.5 milliLiter(s) IntraMuscular once  ferrous    sulfate 325 milliGRAM(s) Oral daily  heparin   Injectable 5000 Unit(s) SubCutaneous every 12 hours  ibuprofen  Tablet. 600 milliGRAM(s) Oral every 6 hours  influenza   Vaccine 0.5 milliLiter(s) IntraMuscular once  lactated ringers Bolus 1000 milliLiter(s) IV Bolus once  lactated ringers Bolus 500 milliLiter(s) IV Bolus once  lactated ringers. 1000 milliLiter(s) (125 mL/Hr) IV Continuous <Continuous>  prenatal multivitamin 1 Tablet(s) Oral daily  senna 2 Tablet(s) Oral at bedtime    MEDICATIONS  (PRN):  diphenhydrAMINE 25 milliGRAM(s) Oral every 6 hours PRN Pruritus  lanolin Ointment 1 Application(s) Topical every 6 hours PRN Sore Nipples  magnesium hydroxide Suspension 30 milliLiter(s) Oral two times a day PRN Constipation  oxyCODONE    IR 5 milliGRAM(s) Oral once PRN Moderate to Severe Pain (4-10)  oxyCODONE    IR 5 milliGRAM(s) Oral every 3 hours PRN Moderate to Severe Pain (4-10)  simethicone 80 milliGRAM(s) Chew every 4 hours PRN Gas      LABS:  Blood type: A Positive  Rubella IgG: RPR: Negative                          11.6   13.38<H> >-----------< 208    ( 09-27 @ 05:00 )             33.6<L>                        12.9   11.31<H> >-----------< 214    ( 09-26 @ 11:50 )             38.7    Physical exam:  Gen: NAD  Abdomen: Soft, nontender, no distension , firm uterine fundus at umbilicus.  Incision: Clean, dry, and intact with steristrips  Pelvic: Normal lochia noted  Ext: No calf tenderness    A/P: 34yo POD#3 s/p scheduled repeat LTCS.  Patient is stable and is doing well post-operatively.  - Continue motrin, tylenol, oxycodone PRN for pain control.  - Increase ambulation  - Encouraged use of abdominal binder  - Continue regular diet  - GTT in 6 weeks for gdma1  - Discharge planned for today    Lourdes EMMANUEL

## 2023-09-29 NOTE — PROGRESS NOTE ADULT - SUBJECTIVE AND OBJECTIVE BOX
S: Patient doing well. No complaints. Minimal lochia. Pain controlled.    O: Vital Signs Last 24 Hrs  T(C): 36.3 (29 Sep 2023 06:25), Max: 37 (28 Sep 2023 21:49)  T(F): 97.4 (29 Sep 2023 06:25), Max: 98.6 (28 Sep 2023 21:49)  HR: 75 (29 Sep 2023 06:25) (75 - 92)  BP: 103/68 (29 Sep 2023 06:25) (94/60 - 105/65)  BP(mean): --  RR: 17 (29 Sep 2023 06:25) (16 - 17)  SpO2: 99% (29 Sep 2023 06:25) (96% - 100%)    Parameters below as of 29 Sep 2023 06:25  Patient On (Oxygen Delivery Method): room air        Gen: NAD  Abd: soft, Nontender, Nondistended, fundus firm  Ext: no tendern, mild edema    Labs:      A: 33y POD#1 s/p c/s doing well.    Plan:  Routine postpartum care  Encouraged out of bed  Regular diet

## 2023-10-02 PROBLEM — O24.419 GESTATIONAL DIABETES MELLITUS IN PREGNANCY, UNSPECIFIED CONTROL: Chronic | Status: ACTIVE | Noted: 2023-09-26

## 2023-10-02 PROBLEM — K21.9 GASTRO-ESOPHAGEAL REFLUX DISEASE WITHOUT ESOPHAGITIS: Chronic | Status: ACTIVE | Noted: 2023-09-26

## 2023-10-12 ENCOUNTER — APPOINTMENT (OUTPATIENT)
Dept: OBGYN | Facility: CLINIC | Age: 33
End: 2023-10-12
Payer: MEDICAID

## 2023-10-12 VITALS
WEIGHT: 138 LBS | BODY MASS INDEX: 25.4 KG/M2 | DIASTOLIC BLOOD PRESSURE: 63 MMHG | HEIGHT: 62 IN | SYSTOLIC BLOOD PRESSURE: 101 MMHG

## 2023-10-12 DIAGNOSIS — R05.9 COUGH, UNSPECIFIED: ICD-10-CM

## 2023-10-12 DIAGNOSIS — N76.0 ACUTE VAGINITIS: ICD-10-CM

## 2023-10-12 PROCEDURE — 99213 OFFICE O/P EST LOW 20 MIN: CPT | Mod: TH

## 2023-10-12 RX ORDER — CLOTRIMAZOLE AND BETAMETHASONE DIPROPIONATE 10; .5 MG/G; MG/G
1-0.05 CREAM TOPICAL TWICE DAILY
Qty: 1 | Refills: 0 | Status: ACTIVE | COMMUNITY
Start: 2023-10-12 | End: 1900-01-01

## 2023-10-12 RX ORDER — GUAIFENESIN 100 MG/5ML
100 LIQUID ORAL EVERY 4 HOURS
Qty: 420 | Refills: 0 | Status: ACTIVE | COMMUNITY
Start: 2023-10-12 | End: 1900-01-01

## 2023-10-24 ENCOUNTER — APPOINTMENT (OUTPATIENT)
Dept: OBGYN | Facility: CLINIC | Age: 33
End: 2023-10-24

## 2023-10-25 ENCOUNTER — APPOINTMENT (OUTPATIENT)
Dept: OBGYN | Facility: CLINIC | Age: 33
End: 2023-10-25
Payer: MEDICAID

## 2023-10-25 VITALS — WEIGHT: 139 LBS | SYSTOLIC BLOOD PRESSURE: 105 MMHG | DIASTOLIC BLOOD PRESSURE: 73 MMHG | BODY MASS INDEX: 25.42 KG/M2

## 2023-10-25 PROCEDURE — 99213 OFFICE O/P EST LOW 20 MIN: CPT | Mod: TH

## 2023-11-16 ENCOUNTER — APPOINTMENT (OUTPATIENT)
Dept: OBGYN | Facility: CLINIC | Age: 33
End: 2023-11-16
Payer: MEDICAID

## 2023-11-16 VITALS
SYSTOLIC BLOOD PRESSURE: 106 MMHG | DIASTOLIC BLOOD PRESSURE: 69 MMHG | BODY MASS INDEX: 25.21 KG/M2 | HEIGHT: 62 IN | WEIGHT: 137 LBS

## 2023-11-16 PROCEDURE — 99213 OFFICE O/P EST LOW 20 MIN: CPT | Mod: TH

## 2023-11-22 ENCOUNTER — APPOINTMENT (OUTPATIENT)
Dept: OBGYN | Facility: CLINIC | Age: 33
End: 2023-11-22

## 2024-02-27 NOTE — ED PROVIDER NOTE - CADM POA PRESS ULCER
Lipid abnormalities are stable    Plan:  Continue same medication/s without change.      Discussed medication dosage, use, side effects, and goals of treatment in detail.    Counseled patient on lifestyle modifications to help control hyperlipidemia.     Patient Treatment Goals:   LDL goal is less than 70    Followup in 6 months.   No

## 2024-04-05 ENCOUNTER — APPOINTMENT (OUTPATIENT)
Dept: OBGYN | Facility: CLINIC | Age: 34
End: 2024-04-05
Payer: MEDICAID

## 2024-04-05 VITALS
HEIGHT: 62 IN | BODY MASS INDEX: 25.58 KG/M2 | SYSTOLIC BLOOD PRESSURE: 99 MMHG | WEIGHT: 139 LBS | DIASTOLIC BLOOD PRESSURE: 64 MMHG

## 2024-04-05 DIAGNOSIS — Z01.419 ENCOUNTER FOR GYNECOLOGICAL EXAMINATION (GENERAL) (ROUTINE) W/OUT ABNORMAL FINDINGS: ICD-10-CM

## 2024-04-05 PROCEDURE — 99395 PREV VISIT EST AGE 18-39: CPT

## 2024-04-05 RX ORDER — CHLORHEXIDINE GLUCONATE 213 G/1000ML
4 SOLUTION TOPICAL
Qty: 1 | Refills: 0 | Status: ACTIVE | COMMUNITY
Start: 2024-04-05 | End: 1900-01-01

## 2024-04-05 RX ORDER — MAGNESIUM HYDROXIDE 400 MG/5ML
27-0.8-25 SUSPENSION, ORAL (FINAL DOSE FORM) ORAL
Qty: 90 | Refills: 3 | Status: ACTIVE | COMMUNITY
Start: 2024-04-05 | End: 1900-01-01

## 2024-04-07 NOTE — PHYSICAL EXAM
[Appropriately responsive] : appropriately responsive [Alert] : alert [No Acute Distress] : no acute distress [Soft] : soft [Non-tender] : non-tender [Non-distended] : non-distended [No HSM] : No HSM [No Lesions] : no lesions [No Mass] : no mass [Oriented x3] : oriented x3 [FreeTextEntry7] : C/S incision with odor [Examination Of The Breasts] : a normal appearance [No Masses] : no breast masses were palpable [Labia Majora] : normal [Labia Minora] : normal [Normal] : normal [Uterine Adnexae] : normal

## 2024-04-07 NOTE — DISCUSSION/SUMMARY
[FreeTextEntry1] : 34 year old P2 presenting for annual exam -f/u pap and GC/CT -Incisional odor: reviewed care of incision with patient and partner - Rx hibiclens wash for use 2x daily -Contraception: none -f/u PRN

## 2024-04-07 NOTE — HISTORY OF PRESENT ILLNESS
[FreeTextEntry1] : Patient is a 34 year old P2 presenting for an annual visit. LMP unknown - still nursing . She is feeling well but c/o odor from C/S incision. She denies vaginal itching, odor and discharge. Denies urinary urgency, frequency and dysuria. She is currently sexually active with one male partner.  OBHx: 3/3/17: primary C/S for arrest at 4cm, male, 6#10 2018: ectopic right salpingectomy 2020: SAB 9/26/23: rpt C/S, female, 6#12, c/b GDM

## 2024-04-10 LAB
C TRACH RRNA SPEC QL NAA+PROBE: NOT DETECTED
HPV HIGH+LOW RISK DNA PNL CVX: NOT DETECTED
N GONORRHOEA RRNA SPEC QL NAA+PROBE: NOT DETECTED
SOURCE AMPLIFICATION: NORMAL

## 2024-04-14 LAB — CYTOLOGY CVX/VAG DOC THIN PREP: NORMAL

## 2024-05-09 NOTE — OB RN PATIENT PROFILE - PATIENT REPRESENTATIVE: ( YOU CAN CHOOSE ANY PERSON THAT CAN ASSIST YOU WITH YOUR HEALTH CARE PREFERENCES, DOES NOT HAVE TO BE A SPOUSE, IMMEDIATE FAMILY OR SIGNIFICANT OTHER/PARTNER)
[facility]  [facility] [address]   Phone: [phone]       Next Steps: Follow up in 2 week(s)          Pt d/c home with family. No d/c needs reported by medical team at this time.     Linda Rush LCSW  Case Management/[facility]  [phone]     Declines

## 2024-05-30 NOTE — ED ADULT NURSE NOTE - CHIEF COMPLAINT
POST-OP DIAGNOSIS:  Acute appendicitis 30-May-2024 15:35:58  Abdoulaye Gaona  
The patient is a 33y Female complaining of

## 2024-06-01 NOTE — DISCHARGE NOTE OB - NPI NUMBER (FOR SYSADMIN USE ONLY) :
[[id number]] Airway patent, Nasal mucosa clear. Mouth with normal mucosa. Throat has no vesicles, no oropharyngeal exudates and uvula is midline. no midline cervical tenderness

## 2024-07-18 NOTE — ED ADULT NURSE NOTE - PAIN: PRESENCE, MLM
FOLLOW UP VISIT:    DATE OF VISIT:  7/19/2024     Primary Care Physician:  Britta Fragoso MD     Chief Complaint:   Chief Complaint   Patient presents with   • Office Visit       The patient is accompanied by her     History of Present Illness:  Elizabeth Villanueva is a 81 year old female last seen inpatient 06/27/2024, admitted with a ruptured appendicitis. Patient underwent IR drain placement for intra-abdominal abscess 06/26/2024. Patient is seen here in clinic today to discuss interval appendectomy.  She states she is having abdominal pain constantly, some days it is worse. She has not had a bowel movement in 48 hours which is not normal for her. She said she is going to take Miralax. She was having diarrhea at times. She is not on antibiotic.    Last scan on 07/10/2024 shows no residual fluid collection    Past medical history, surgical history, medications, and allergies reviewed and are unchanged.    Physical Exam:   There were no vitals taken for this visit.    General: Healthy appearing in no apparent distress. Well nourished, well developed.    Neck:  Trachea midline.   Heart: Regular rate and rhythm. Normal S1 and S2, no audible murmurs.   Lungs: Clear to auscultation bilaterally with good air entry, normal respiratory effort.  Abdomen: Soft, nontender to deep palpation throughout the abdomen, nondistended. No hepatomegaly or splenomegaly.  No palpable masses.   Skin: Warm and dry to inspection and palpation without rashes.     Labs:  Component  Ref Range & Units 8 d ago  (7/10/24) 3 wk ago  (6/27/24) 3 wk ago  (6/26/24) 3 wk ago  (6/25/24) 4 mo ago  (2/23/24) 1 yr ago  (3/6/23) 1 yr ago  (8/29/22)   WBC  4.2 - 11.0 K/mcL 10.3 10.0 15.0 High  17.4 High  13.8 High  9.1 11.8 High    RBC  4.00 - 5.20 mil/mcL 4.31 3.54 Low  3.84 Low  3.93 Low  4.53 4.75 4.49   HGB  12.0 - 15.5 g/dL 13.4 11.1 Low  12.0 12.5 14.2 14.2 13.6   HCT  36.0 - 46.5 % 39.9 33.3 Low  36.1 36.8 43.6 44.7 43.0   MCV  78.0 - 100.0  fl 92.6 94.1 94.0 93.6 96.2 94.1 95.8   MCH  26.0 - 34.0 pg 31.1 31.4 31.3 31.8 31.3 29.9 30.3   MCHC  32.0 - 36.5 g/dL 33.6 33.3 33.2 34.0 32.6 31.8 Low  31.6 Low    RDW-CV  11.0 - 15.0 % 13.9 12.9 13.0 12.9 15.9 High  14.9 15.3 High    RDW-SD  39.0 - 50.0 fL 46.9 44.7 44.4 44.6 56.6 High  51.8 High  53.9 High    PLT  140 - 450 K/mcL 441 387 416 424 379 335 324   NRBC  <=0 /100 WBC 0 0 0 0 0 0 0   Neutrophil, Percent  % 65 68  75 67 45 47   Lymphocytes, Percent  % 28 23  16 27 45 43   Mono, Percent  % 5 7  7 5 7 7   Eosinophils, Percent  % 1 1  1 0 2 2   Basophils, Percent  % 0 0  0 0 1 0   Immature Granulocytes  % 1 1  1 1 0 1   Absolute Neutrophils  1.8 - 7.7 K/mcL 6.7 6.8  13.1 High  9.3 High  4.1 5.7   Absolute Lymphocytes  1.0 - 4.0 K/mcL 2.8 2.3  2.8 3.7 4.1 High  5.1 High    Absolute Monocytes  0.3 - 0.9 K/mcL 0.5 0.7  1.3 High  0.6 0.6 0.8   Absolute Eosinophils  0.0 - 0.5 K/mcL 0.1 0.1  0.1 0.0 0.2 0.2   Absolute Basophils  0.0 - 0.3 K/mcL 0.0 0.0  0.1 0.1 0.1 0.1   Absolute Immature Granulocytes  0.0 - 0.2 K/mcL 0.1 0.1  0.2 0.1 0.0 0.1     Component  Ref Range & Units 8 d ago  (7/10/24) 3 wk ago  (6/27/24) 3 wk ago  (6/26/24) 3 wk ago  (6/25/24) 3 mo ago  (3/28/24) 4 mo ago  (2/23/24) 11 mo ago  (8/21/23)   Fasting Status       10 R   Sodium  135 - 145 mmol/L 136 138 138 137  138 138   Potassium  3.4 - 5.1 mmol/L 4.1 3.5 3.9 4.2  4.7 5.1   Chloride  97 - 110 mmol/L 100 104 103 101  105 109   Carbon Dioxide  21 - 32 mmol/L 27 22 23 29  25 28   Anion Gap  7 - 19 mmol/L 13 16 16 11  13 6 Low    Glucose  70 - 99 mg/dL 93 77 83 96  112 High  113 High    BUN  6 - 20 mg/dL 9 5 Low  6 9  15 16   Creatinine  0.51 - 0.95 mg/dL 0.78 0.77 0.91 0.87 1.00 High  0.88 0.88   Glomerular Filtration Rate  >=60 77 78 CM 64 CM 67 CM 57 Low  CM 67 CM 67 CM   Comment: eGFR results = or >60 mL/min/1.73m2 = Normal kidney function. Estimated GFR calculated using the CKD-EPI-R (2021) equation that does not include race in the  creatinine calculation.   BUN/Cr  7 - 25 12 6 Low  7 10  17 18   Calcium  8.4 - 10.2 mg/dL 9.4 8.7 8.7 9.6  10.4 High  9.4   Bilirubin, Total  0.2 - 1.0 mg/dL 0.3 0.3 0.5 0.3  0.4 0.4   GOT/AST  <=37 Units/L 19 13 13 15  21 19   GPT/ALT  <64 Units/L 10 10 12 13  20 17   Alkaline Phosphatase  45 - 117 Units/L 87 64 70 82  107 83   Albumin  3.6 - 5.1 g/dL 3.7 2.2 Low  2.5 Low  2.9 Low   4.3 3.9   Protein, Total  6.4 - 8.2 g/dL 7.5 5.8 Low  6.2 Low  7.1  7.5 6.7   Globulin  2.0 - 4.0 g/dL 3.8 3.6 3.7 4.2 High   3.2 2.8   A/G Ratio  1.0 - 2.4 1.0 0.6 Low  0.7 Low  0.7 Low   1.3 1.4     Imaging:  EXAM: CT ABDOMEN PELVIS W CONTRAST 07/10/2024     CLINICAL HISTORY: Drain check, RLQ abscess     TECHNIQUE: Helical imaging through the abdomen and pelvis was performed  following administration of 100 mL of Omnipaque 300 intravenous contrast.     COMPARISON: June 26, 2024.     FINDINGS: There has been interval placement of a pigtail drainage catheter  into the right lower quadrant at the site of the previous fluid collection.  No residual organized fluid collection is seen. There is mild regional  stranding consistent with inflammation.     The liver, spleen, pancreas, adrenal glands and kidneys are unchanged. The  gallbladder is free of calcified gallstones. Abdominal and pelvic bowel  loops are within normal limits. The bladder is normal. No other mass, fluid  collection or inflammatory process is seen in the abdomen or pelvis.      IMPRESSION: No residual organized fluid collection is identified in the  region of the drainage catheter.     Electronically Signed by: Trev Abraham MD  Signed on: 7/10/2024 3:25 PM    EXAM: CT ABDOMEN PELVIS W CONTRAST 06/25/2024    IMPRESSION:  Acute appendicitis with rupture. Findings include an extensive inflammatory  process in the pericecal tissues including an abscess measuring 35 x 25 x  40 mm. The terminal ileum is located immediately adjacent to this abscess  and exhibits reactive  wall thickening.    IMPRESSION AND PLAN:  Elizabeth Villanueva is a 81 year old female with ruptured appendicitis with IR drain placement. Last scan showed no residual fluid and drain was removed a week ago.     Patient contacted Dr Fragoso for pain medication on 07/15/2024, pain rated 6-7/10. Patient was prescribed Norco 1 tablet q 8 hours on 07/16/2024 by Dr Fragoso.     I recommend she take OTC medication once the Norco runs out.    In another 2 weeks she will be at the 6 week [facility] and I feel this would be given enough time to undergo interval appendectomy at that point. I explained that waiting longer will help with resolution of inflammation and healing from her episode and this will greatly lessen any risk when undergoing the appendectomy.     I did explain the appendectomy, risks and benefits with the patient and her . The risks include but are not limited to bleeding, infection, recurrence, wound healing complications, abscess, leak,  brain, cardiac, pulmonary, DVT or other unexpected complications, even death.     I did discuss the scans and the last one has shown improvement and maybe her pain could be coming from another source not seen on CT scans.    If her pain does become unbearable I recommend she go to the ED.    Patient and her 's questions were answered to their satisfaction, they stated understanding of the plan.    On 7/19/2024, ISarah scribed the services personally performed by Mingo Sotelo MD.     The documentation recorded by the scribe accurately and completely reflects the service(s) I personally performed and the decisions made by me.   Mingo Sotelo MD           denies pain/discomfort (Rating = 0)

## 2024-08-28 RX ORDER — ASCORBIC ACID, CHOLECALCIFEROL, .ALPHA.-TOCOPHEROL, DL-, FOLIC ACID, PYRIDOXINE HYDROCHLORIDE, CYANOCOBALAMIN, CALCIUM FORMATE, FERROUS ASPARTO GLYCINATE, MAGNESIUM OXIDE AND DOCONEXENT 90; 400; 40; 1; 26; 25; 155; 18; 50; 300 MG/1; [IU]/1; [IU]/1; MG/1; MG/1; UG/1; MG/1; MG/1; MG/1; MG/1
18-0.6-0.4-3 CAPSULE, GELATIN COATED ORAL DAILY
Qty: 90 | Refills: 3 | Status: ACTIVE | COMMUNITY
Start: 2024-08-28 | End: 1900-01-01

## 2025-01-01 RX ORDER — ASCORBIC ACID, CHOLECALCIFEROL, .ALPHA.-TOCOPHEROL ACETATE, DL-, PYRIDOXINE HYDROCHLORIDE, FOLIC ACID, CYANOCOBALAMIN, BIOTIN, CALCIUM CARBONATE, FERROUS ASPARTO GLYCINATE, IRON, POTASSIUM IODIDE, MAGNESIUM OXIDE, DOCONEXENT AND LOWBUSH BLUEBERRY 60; 1000; 10; 26; 400; 13; 280; 80; 9; 9; 150; 25; 350; 25; 600 MG/1; [IU]/1; [IU]/1; MG/1; UG/1; UG/1; UG/1; MG/1; MG/1; MG/1; UG/1; MG/1; MG/1; MG/1; UG/1
18-0.6-0.4-35 CAPSULE, GELATIN COATED ORAL
Qty: 30 | Refills: 11 | Status: ACTIVE | COMMUNITY
Start: 2024-12-31 | End: 1900-01-01

## 2025-04-08 ENCOUNTER — APPOINTMENT (OUTPATIENT)
Dept: OBGYN | Facility: CLINIC | Age: 35
End: 2025-04-08
Payer: MEDICAID

## 2025-04-08 VITALS
BODY MASS INDEX: 25.76 KG/M2 | HEIGHT: 62 IN | WEIGHT: 140 LBS | DIASTOLIC BLOOD PRESSURE: 68 MMHG | SYSTOLIC BLOOD PRESSURE: 106 MMHG

## 2025-04-08 DIAGNOSIS — Z01.419 ENCOUNTER FOR GYNECOLOGICAL EXAMINATION (GENERAL) (ROUTINE) W/OUT ABNORMAL FINDINGS: ICD-10-CM

## 2025-04-08 DIAGNOSIS — Z00.00 ENCOUNTER FOR GENERAL ADULT MEDICAL EXAMINATION W/OUT ABNORMAL FINDINGS: ICD-10-CM

## 2025-04-08 PROCEDURE — 99395 PREV VISIT EST AGE 18-39: CPT | Mod: 25

## 2025-04-08 PROCEDURE — G0444 DEPRESSION SCREEN ANNUAL: CPT | Mod: 59

## 2025-04-13 LAB — CYTOLOGY CVX/VAG DOC THIN PREP: NORMAL

## 2025-04-17 ENCOUNTER — APPOINTMENT (OUTPATIENT)
Dept: SURGERY | Facility: CLINIC | Age: 35
End: 2025-04-17
Payer: MEDICAID

## 2025-04-17 VITALS
OXYGEN SATURATION: 98 % | BODY MASS INDEX: 25.76 KG/M2 | HEIGHT: 62 IN | DIASTOLIC BLOOD PRESSURE: 74 MMHG | WEIGHT: 140 LBS | HEART RATE: 81 BPM | SYSTOLIC BLOOD PRESSURE: 108 MMHG | TEMPERATURE: 98.2 F

## 2025-04-17 PROCEDURE — 99204 OFFICE O/P NEW MOD 45 MIN: CPT

## 2025-04-30 ENCOUNTER — OUTPATIENT (OUTPATIENT)
Dept: OUTPATIENT SERVICES | Facility: HOSPITAL | Age: 35
LOS: 1 days | End: 2025-04-30

## 2025-04-30 VITALS
WEIGHT: 139.99 LBS | SYSTOLIC BLOOD PRESSURE: 99 MMHG | RESPIRATION RATE: 17 BRPM | OXYGEN SATURATION: 97 % | DIASTOLIC BLOOD PRESSURE: 66 MMHG | HEIGHT: 62 IN | HEART RATE: 89 BPM | TEMPERATURE: 99 F

## 2025-04-30 DIAGNOSIS — K43.9 VENTRAL HERNIA WITHOUT OBSTRUCTION OR GANGRENE: ICD-10-CM

## 2025-04-30 DIAGNOSIS — Z98.890 OTHER SPECIFIED POSTPROCEDURAL STATES: Chronic | ICD-10-CM

## 2025-04-30 DIAGNOSIS — Z98.891 HISTORY OF UTERINE SCAR FROM PREVIOUS SURGERY: Chronic | ICD-10-CM

## 2025-04-30 DIAGNOSIS — Z90.79 ACQUIRED ABSENCE OF OTHER GENITAL ORGAN(S): Chronic | ICD-10-CM

## 2025-04-30 DIAGNOSIS — Z01.818 ENCOUNTER FOR OTHER PREPROCEDURAL EXAMINATION: ICD-10-CM

## 2025-04-30 LAB
ANION GAP SERPL CALC-SCNC: 3 MMOL/L — LOW (ref 5–17)
BUN SERPL-MCNC: 13 MG/DL — SIGNIFICANT CHANGE UP (ref 7–23)
CALCIUM SERPL-MCNC: 9.2 MG/DL — SIGNIFICANT CHANGE UP (ref 8.5–10.1)
CHLORIDE SERPL-SCNC: 107 MMOL/L — SIGNIFICANT CHANGE UP (ref 96–108)
CO2 SERPL-SCNC: 27 MMOL/L — SIGNIFICANT CHANGE UP (ref 22–31)
CREAT SERPL-MCNC: 0.6 MG/DL — SIGNIFICANT CHANGE UP (ref 0.5–1.3)
EGFR: 121 ML/MIN/1.73M2 — SIGNIFICANT CHANGE UP
EGFR: 121 ML/MIN/1.73M2 — SIGNIFICANT CHANGE UP
GLUCOSE SERPL-MCNC: 107 MG/DL — HIGH (ref 70–99)
HCG SERPL-ACNC: <1 MIU/ML — SIGNIFICANT CHANGE UP
HCT VFR BLD CALC: 38.9 % — SIGNIFICANT CHANGE UP (ref 34.5–45)
HGB BLD-MCNC: 12.8 G/DL — SIGNIFICANT CHANGE UP (ref 11.5–15.5)
MCHC RBC-ENTMCNC: 25.8 PG — LOW (ref 27–34)
MCHC RBC-ENTMCNC: 32.9 G/DL — SIGNIFICANT CHANGE UP (ref 32–36)
MCV RBC AUTO: 78.4 FL — LOW (ref 80–100)
NRBC BLD AUTO-RTO: 0 /100 WBCS — SIGNIFICANT CHANGE UP (ref 0–0)
PLATELET # BLD AUTO: 275 K/UL — SIGNIFICANT CHANGE UP (ref 150–400)
POTASSIUM SERPL-MCNC: 3.7 MMOL/L — SIGNIFICANT CHANGE UP (ref 3.5–5.3)
POTASSIUM SERPL-SCNC: 3.7 MMOL/L — SIGNIFICANT CHANGE UP (ref 3.5–5.3)
RBC # BLD: 4.96 M/UL — SIGNIFICANT CHANGE UP (ref 3.8–5.2)
RBC # FLD: 13.9 % — SIGNIFICANT CHANGE UP (ref 10.3–14.5)
SODIUM SERPL-SCNC: 137 MMOL/L — SIGNIFICANT CHANGE UP (ref 135–145)
WBC # BLD: 6.47 K/UL — SIGNIFICANT CHANGE UP (ref 3.8–10.5)
WBC # FLD AUTO: 6.47 K/UL — SIGNIFICANT CHANGE UP (ref 3.8–10.5)

## 2025-04-30 RX ORDER — B1/B2/B3/B5/B6/B12/VIT C/FOLIC 500-0.5 MG
1 TABLET ORAL
Refills: 0 | DISCHARGE

## 2025-04-30 NOTE — H&P PST ADULT - ASSESSMENT
35F no pmhx c/o abdominal pain 2/2 ventral hernia here for PST for scheduled robotic assisted laparoscopic anterior abdominal wall hernia repair with mesh to be performed by Dr. Wild on 2025  CAPRINI SCORE    AGE RELATED RISK FACTORS                                                             [ ] Age 41-60 years                                            (1 Point)  [ ] Age: 61-74 years                                           (2 Points)                 [ ] Age= 75 years                                                (3 Points)             DISEASE RELATED RISK FACTORS                                                       [ ] Edema in the lower extremities                 (1 Point)                     [ ] Varicose veins                                               (1 Point)                                 [ ] BMI > 25 Kg/m2                                            (1 Point)                                  [ ] Serious infection (ie PNA)                            (1 Point)                     [ ] Lung disease ( COPD, Emphysema)            (1 Point)                                                                          [ ] Acute myocardial infarction                         (1 Point)                  [ ] Congestive heart failure (in the previous month)  (1 Point)         [ ] Inflammatory bowel disease                            (1 Point)                  [ ] Central venous access, PICC or Port               (2 points)       (within the last month)                                                                [ ] Stroke (in the previous month)                        (5 Points)    [ ] Previous or present malignancy                       (2 points)                                                                                                                                                         HEMATOLOGY RELATED FACTORS                                                         [ ] Prior episodes of VTE                                     (3 Points)                     [ ] Positive family history for VTE                      (3 Points)                  [ ] Prothrombin 35842 A                                     (3 Points)                     [ ] Factor V Leiden                                                (3 Points)                        [ ] Lupus anticoagulants                                      (3 Points)                                                           [ ] Anticardiolipin antibodies                              (3 Points)                                                       [ ] High homocysteine in the blood                   (3 Points)                                             [ ] Other congenital or acquired thrombophilia      (3 Points)                                                [ ] Heparin induced thrombocytopenia                  (3 Points)                                        MOBILITY RELATED FACTORS  [ ] Bed rest                                                         (1 Point)  [ ] Plaster cast                                                    (2 points)  [ ] Bed bound for more than 72 hours           (2 Points)    GENDER SPECIFIC FACTORS  [ ] Pregnancy or had a baby within the last month   (1 Point)  [ ] Post-partum < 6 weeks                                   (1 Point)  [ ] Hormonal therapy  or oral contraception   (1 Point)  [ ] History of pregnancy complications              (1 point)  [ ] Unexplained or recurrent              (1 Point)    OTHER RISK FACTORS                                           (1 Point)  [ ] BMI >40, smoking, diabetes requiring insulin, chemotherapy  blood transfusions and length of surgery over 2 hours    SURGERY RELATED RISK FACTORS  [ ]  Section within the last month     (1 Point)  [ ] Minor surgery                                                  (1 Point)  [ ] Arthroscopic surgery                                       (2 Points)  [x ] Planned major surgery lasting more            (2 Points)      than 45 minutes     [ ] Elective hip or knee joint replacement       (5 points)       surgery                                                TRAUMA RELATED RISK FACTORS  [ ] Fracture of the hip, pelvis, or leg                       (5 Points)  [ ] Spinal cord injury resulting in paralysis             (5 points)       (in the previous month)    [ ] Paralysis  (less than 1 month)                             (5 Points)  [ ] Multiple Trauma within 1 month                        (5 Points)    Total Score [    2    ]    Caprini Score 0-2: Low Risk, NO VTE prophylaxis required for most patients, encourage ambulation  Caprini Score 3-6: Moderate Risk , pharmacologic VTE prophylaxis is indicated for most patients (in the absence of contraindications)  Caprini Score Greater than or =7: High risk, pharmocologic VTE prophylaxis indicated for most patients (in the absence of contraindications)

## 2025-04-30 NOTE — H&P PST ADULT - PROBLEM SELECTOR PLAN 1
Robotic assisted laparoscopic anterior abdominal wall hernia repair with mesh  Pre-op instructions given, patient verbalized understanding  Chlorhexidine wash instructions given   Anesthesiologist to review PST labs, EKG, required clearances and optimization for surgery.

## 2025-04-30 NOTE — H&P PST ADULT - HISTORY OF PRESENT ILLNESS
35F no pmhx c/o abdominal pain 2/2 ventral hernia here for PST for scheduled robotic assisted laparoscopic anterior abdominal wall hernia repair with mesh to be performed by Dr. Wild on 05-

## 2025-04-30 NOTE — H&P PST ADULT - NSANTHTIREDRD_ENT_A_CORE
"Goal Outcome Evaluation:  Plan of Care Reviewed With: patient     Assessment: Lulu Graves presents with ADL impairments affecting function including endurance / activity tolerance, pain, range of motion (ROM), and strength. Demonstrated functioning below baseline abilities indicate the need for continued skilled intervention while inpatient. Tolerating session today without incident. Will continue to follow and progress as tolerated.     Plan/Recommendations:   Low Intensity Therapy recommended post-acute care - This is recommended as therapy feels this patient would require 2-3 visits per week. OP or HH would be the best option depending on patient's home bound status. Consider, if the patient has other  \"skilled\" needs such as wounds, IV antibiotics, etc. Combined with \"low intensity\" could also equate to a SNF. If patient is medically complex, consider LTAC.. Pt requires no DME at discharge.     Pt desires Outpatient therapy at discharge. Pt cooperative; agreeable to therapeutic recommendations and plan of care.                    " No

## 2025-05-14 ENCOUNTER — APPOINTMENT (OUTPATIENT)
Dept: SURGERY | Facility: HOSPITAL | Age: 35
End: 2025-05-14

## 2025-05-19 ENCOUNTER — APPOINTMENT (OUTPATIENT)
Dept: SURGERY | Facility: CLINIC | Age: 35
End: 2025-05-19
Payer: MEDICAID

## 2025-05-19 VITALS
HEART RATE: 78 BPM | HEIGHT: 62 IN | BODY MASS INDEX: 25.76 KG/M2 | OXYGEN SATURATION: 98 % | TEMPERATURE: 98.2 F | WEIGHT: 140 LBS | DIASTOLIC BLOOD PRESSURE: 63 MMHG | SYSTOLIC BLOOD PRESSURE: 101 MMHG

## 2025-05-19 PROCEDURE — 99213 OFFICE O/P EST LOW 20 MIN: CPT
